# Patient Record
Sex: FEMALE | Race: WHITE | NOT HISPANIC OR LATINO
[De-identification: names, ages, dates, MRNs, and addresses within clinical notes are randomized per-mention and may not be internally consistent; named-entity substitution may affect disease eponyms.]

---

## 2017-09-19 ENCOUNTER — HOSPITAL ENCOUNTER (OUTPATIENT)
Dept: LAB | Age: 37
Setting detail: SPECIMEN
Discharge: HOME | End: 2017-09-19

## 2018-01-13 ENCOUNTER — RECORDS - HEALTHEAST (OUTPATIENT)
Dept: LAB | Facility: CLINIC | Age: 38
End: 2018-01-13

## 2018-01-16 LAB — BACTERIA SPEC CULT: ABNORMAL

## 2018-06-01 ENCOUNTER — RECORDS - HEALTHEAST (OUTPATIENT)
Dept: LAB | Facility: CLINIC | Age: 38
End: 2018-06-01

## 2018-06-01 LAB
IRON SERPL-MCNC: 48 UG/DL (ref 42–175)
TSH SERPL DL<=0.005 MIU/L-ACNC: 1.46 UIU/ML (ref 0.3–5)

## 2018-06-04 LAB
C TRACH DNA SPEC QL PROBE+SIG AMP: NEGATIVE
N GONORRHOEA DNA SPEC QL NAA+PROBE: NEGATIVE

## 2018-06-05 LAB
HPV SOURCE: NORMAL
HUMAN PAPILLOMA VIRUS 16 DNA: NEGATIVE
HUMAN PAPILLOMA VIRUS 18 DNA: NEGATIVE
HUMAN PAPILLOMA VIRUS FINAL DIAGNOSIS: NORMAL
HUMAN PAPILLOMA VIRUS OTHER HR: NEGATIVE
SPECIMEN DESCRIPTION: NORMAL

## 2018-06-08 LAB
BKR LAB AP ABNORMAL BLEEDING: NO
BKR LAB AP BIRTH CONTROL/HORMONES: NORMAL
BKR LAB AP CERVICAL APPEARANCE: NORMAL
BKR LAB AP GYN ADEQUACY: NORMAL
BKR LAB AP GYN INTERPRETATION: NORMAL
BKR LAB AP HPV REFLEX: NORMAL
BKR LAB AP LMP: NORMAL
BKR LAB AP PATIENT STATUS: NORMAL
BKR LAB AP PREVIOUS ABNORMAL: NORMAL
BKR LAB AP PREVIOUS NORMAL: 2014
HIGH RISK?: NO
PATH REPORT.COMMENTS IMP SPEC: NORMAL
RESULT FLAG (HE HISTORICAL CONVERSION): NORMAL

## 2018-08-15 ENCOUNTER — RECORDS - HEALTHEAST (OUTPATIENT)
Dept: LAB | Facility: CLINIC | Age: 38
End: 2018-08-15

## 2018-08-15 LAB — POTASSIUM BLD-SCNC: 4.4 MMOL/L (ref 3.5–5)

## 2018-08-16 ASSESSMENT — MIFFLIN-ST. JEOR: SCORE: 1313.18

## 2018-08-20 ENCOUNTER — ANESTHESIA - HEALTHEAST (OUTPATIENT)
Dept: SURGERY | Facility: AMBULATORY SURGERY CENTER | Age: 38
End: 2018-08-20

## 2018-08-21 ENCOUNTER — SURGERY - HEALTHEAST (OUTPATIENT)
Dept: SURGERY | Facility: AMBULATORY SURGERY CENTER | Age: 38
End: 2018-08-21

## 2018-08-21 ASSESSMENT — MIFFLIN-ST. JEOR: SCORE: 1331.32

## 2018-08-22 ENCOUNTER — RECORDS - HEALTHEAST (OUTPATIENT)
Dept: LAB | Facility: CLINIC | Age: 38
End: 2018-08-22

## 2018-08-22 LAB
ALBUMIN SERPL-MCNC: 3.7 G/DL (ref 3.5–5)
ALP SERPL-CCNC: 62 U/L (ref 45–120)
ALT SERPL W P-5'-P-CCNC: 14 U/L (ref 0–45)
ANION GAP SERPL CALCULATED.3IONS-SCNC: 6 MMOL/L (ref 5–18)
AST SERPL W P-5'-P-CCNC: 15 U/L (ref 0–40)
BILIRUB SERPL-MCNC: 0.3 MG/DL (ref 0–1)
BUN SERPL-MCNC: 10 MG/DL (ref 8–22)
CALCIUM SERPL-MCNC: 9.1 MG/DL (ref 8.5–10.5)
CHLORIDE BLD-SCNC: 107 MMOL/L (ref 98–107)
CO2 SERPL-SCNC: 26 MMOL/L (ref 22–31)
CREAT SERPL-MCNC: 0.92 MG/DL (ref 0.6–1.1)
GFR SERPL CREATININE-BSD FRML MDRD: >60 ML/MIN/1.73M2
GLUCOSE BLD-MCNC: 80 MG/DL (ref 70–125)
POTASSIUM BLD-SCNC: 4 MMOL/L (ref 3.5–5)
PROT SERPL-MCNC: 6.4 G/DL (ref 6–8)
SODIUM SERPL-SCNC: 139 MMOL/L (ref 136–145)

## 2018-08-23 LAB
B BURGDOR IGG+IGM SER QL: 0.04 INDEX VALUE
B MICROTI BLD SMEAR: NORMAL

## 2018-08-25 LAB
A PHAGOCYTOPH DNA BLD QL NAA+PROBE: NOT DETECTED
E CHAFFEENSIS DNA BLD QL NAA+PROBE: NOT DETECTED
E EWINGII DNA SPEC QL NAA+PROBE: NOT DETECTED
EHRLICHIA DNA SPEC QL NAA+PROBE: NOT DETECTED

## 2019-07-15 ENCOUNTER — ANESTHESIA - HEALTHEAST (OUTPATIENT)
Dept: SURGERY | Facility: AMBULATORY SURGERY CENTER | Age: 39
End: 2019-07-15

## 2019-07-16 ENCOUNTER — SURGERY - HEALTHEAST (OUTPATIENT)
Dept: SURGERY | Facility: AMBULATORY SURGERY CENTER | Age: 39
End: 2019-07-16

## 2019-07-16 ASSESSMENT — MIFFLIN-ST. JEOR: SCORE: 1331.32

## 2019-12-09 ENCOUNTER — RECORDS - HEALTHEAST (OUTPATIENT)
Dept: ADMINISTRATIVE | Facility: OTHER | Age: 39
End: 2019-12-09

## 2019-12-09 ENCOUNTER — RECORDS - HEALTHEAST (OUTPATIENT)
Dept: LAB | Facility: CLINIC | Age: 39
End: 2019-12-09

## 2019-12-09 LAB
ALBUMIN SERPL-MCNC: 4.2 G/DL (ref 3.5–5)
ALP SERPL-CCNC: 71 U/L (ref 45–120)
ALT SERPL W P-5'-P-CCNC: 11 U/L (ref 0–45)
ANION GAP SERPL CALCULATED.3IONS-SCNC: 9 MMOL/L (ref 5–18)
AST SERPL W P-5'-P-CCNC: 17 U/L (ref 0–40)
BILIRUB SERPL-MCNC: 0.7 MG/DL (ref 0–1)
BUN SERPL-MCNC: 9 MG/DL (ref 8–22)
CALCIUM SERPL-MCNC: 9.4 MG/DL (ref 8.5–10.5)
CHLORIDE BLD-SCNC: 108 MMOL/L (ref 98–107)
CO2 SERPL-SCNC: 24 MMOL/L (ref 22–31)
CREAT SERPL-MCNC: 1.02 MG/DL (ref 0.6–1.1)
ERYTHROCYTE [SEDIMENTATION RATE] IN BLOOD BY WESTERGREN METHOD: 5 MM/HR (ref 0–20)
GFR SERPL CREATININE-BSD FRML MDRD: >60 ML/MIN/1.73M2
GLUCOSE BLD-MCNC: 83 MG/DL (ref 70–125)
IRON SERPL-MCNC: 121 UG/DL (ref 42–175)
POTASSIUM BLD-SCNC: 3.5 MMOL/L (ref 3.5–5)
PROT SERPL-MCNC: 7 G/DL (ref 6–8)
SODIUM SERPL-SCNC: 141 MMOL/L (ref 136–145)
TSH SERPL DL<=0.005 MIU/L-ACNC: 1.85 UIU/ML (ref 0.3–5)
VIT B12 SERPL-MCNC: 1911 PG/ML (ref 213–816)

## 2020-01-16 ENCOUNTER — RECORDS - HEALTHEAST (OUTPATIENT)
Dept: LAB | Facility: CLINIC | Age: 40
End: 2020-01-16

## 2020-01-16 ENCOUNTER — RECORDS - HEALTHEAST (OUTPATIENT)
Dept: ADMINISTRATIVE | Facility: OTHER | Age: 40
End: 2020-01-16

## 2020-01-16 LAB
MAGNESIUM SERPL-MCNC: 2 MG/DL (ref 1.8–2.6)
VIT B12 SERPL-MCNC: 1496 PG/ML (ref 213–816)

## 2020-01-17 ENCOUNTER — RECORDS - HEALTHEAST (OUTPATIENT)
Dept: ADMINISTRATIVE | Facility: OTHER | Age: 40
End: 2020-01-17

## 2020-01-17 ENCOUNTER — RECORDS - HEALTHEAST (OUTPATIENT)
Dept: LAB | Facility: HOSPITAL | Age: 40
End: 2020-01-17

## 2020-01-17 LAB
B BURGDOR IGG+IGM SER QL: 0.05 INDEX VALUE
CK SERPL-CCNC: 37 U/L (ref 30–190)
FERRITIN SERPL-MCNC: 15 NG/ML (ref 10–130)
GLIADIN IGA SER-ACNC: 0.5 U/ML
GLIADIN IGG SER-ACNC: <0.4 U/ML
IGA SERPL-MCNC: 150 MG/DL (ref 65–400)
TTG IGA SER-ACNC: 0.3 U/ML
TTG IGG SER-ACNC: <0.6 U/ML

## 2020-01-19 LAB
B MICROTI IGG TITR SER: NORMAL {TITER}
E CHAFFEENSIS IGG TITR SER IF: NORMAL {TITER}
E CHAFFEENSIS IGM TITR SER IF: NORMAL {TITER}

## 2020-01-20 LAB
ALBUMIN PERCENT: 63.6 % (ref 51–67)
ALBUMIN SERPL ELPH-MCNC: 4.5 G/DL (ref 3.2–4.7)
ALPHA 1 PERCENT: 2.3 % (ref 2–4)
ALPHA 2 PERCENT: 9.8 % (ref 5–13)
ALPHA1 GLOB SERPL ELPH-MCNC: 0.2 G/DL (ref 0.1–0.3)
ALPHA2 GLOB SERPL ELPH-MCNC: 0.7 G/DL (ref 0.4–0.9)
ANA SER QL: 0.4 U
B-GLOBULIN SERPL ELPH-MCNC: 0.7 G/DL (ref 0.7–1.2)
BETA PERCENT: 9.5 % (ref 10–17)
GAMMA GLOB SERPL ELPH-MCNC: 1 G/DL (ref 0.6–1.4)
GAMMA GLOBULIN PERCENT: 14.8 % (ref 9–20)
PATH ICD:: ABNORMAL
PROT PATTERN SERPL ELPH-IMP: ABNORMAL
PROT SERPL-MCNC: 7 G/DL (ref 6–8)
REVIEWING PATHOLOGIST: ABNORMAL

## 2020-01-21 LAB
METHYLMALONATE SERPL-SCNC: <0.1 UMOL/L (ref 0–0.4)
VIT B1 PYROPHOSHATE BLD-SCNC: 116 NMOL/L (ref 70–180)

## 2020-01-24 ENCOUNTER — HOSPITAL ENCOUNTER (OUTPATIENT)
Dept: MRI IMAGING | Facility: HOSPITAL | Age: 40
Discharge: HOME | End: 2020-01-24
Attending: PSYCHIATRY & NEUROLOGY

## 2020-01-24 DIAGNOSIS — R25.3 MUSCLE FASCICULATION: ICD-10-CM

## 2020-01-24 DIAGNOSIS — R41.89 COGNITIVE DEFICITS: ICD-10-CM

## 2020-01-24 DIAGNOSIS — A69.20 LYME DISEASE: ICD-10-CM

## 2020-01-24 DIAGNOSIS — R29.898 ARM WEAKNESS: ICD-10-CM

## 2020-01-24 DIAGNOSIS — G47.00 INSOMNIA: ICD-10-CM

## 2020-01-24 DIAGNOSIS — F41.9 ANXIETY: ICD-10-CM

## 2020-01-28 ENCOUNTER — AMBULATORY - HEALTHEAST (OUTPATIENT)
Dept: BEHAVIORAL HEALTH | Facility: CLINIC | Age: 40
End: 2020-01-28

## 2020-01-28 DIAGNOSIS — R25.3 MUSCULAR FASCICULATION: ICD-10-CM

## 2020-01-28 DIAGNOSIS — G47.00 INSOMNIA: ICD-10-CM

## 2020-01-28 DIAGNOSIS — R29.898 ARM WEAKNESS: ICD-10-CM

## 2020-01-28 DIAGNOSIS — A69.20 LYME DISEASE: ICD-10-CM

## 2020-01-28 DIAGNOSIS — F41.9 ANXIETY: ICD-10-CM

## 2020-01-28 DIAGNOSIS — R41.89 COGNITIVE DEFICITS: ICD-10-CM

## 2020-03-09 ENCOUNTER — AMBULATORY - HEALTHEAST (OUTPATIENT)
Dept: BEHAVIORAL HEALTH | Facility: CLINIC | Age: 40
End: 2020-03-09

## 2020-03-09 DIAGNOSIS — A69.20 LYME DISEASE: ICD-10-CM

## 2020-03-09 DIAGNOSIS — G47.00 INSOMNIA: ICD-10-CM

## 2020-03-09 DIAGNOSIS — R41.89 COGNITIVE DEFICITS: ICD-10-CM

## 2020-03-09 DIAGNOSIS — F41.9 ANXIETY: ICD-10-CM

## 2020-03-09 DIAGNOSIS — R25.3 MUSCULAR FASCICULATION: ICD-10-CM

## 2020-03-09 DIAGNOSIS — R29.898 ARM WEAKNESS: ICD-10-CM

## 2020-03-09 DIAGNOSIS — G47.00 PERSISTENT DISORDER OF INITIATING OR MAINTAINING SLEEP: ICD-10-CM

## 2021-04-16 ENCOUNTER — RECORDS - HEALTHEAST (OUTPATIENT)
Dept: LAB | Facility: CLINIC | Age: 41
End: 2021-04-16

## 2021-04-16 LAB
ANION GAP SERPL CALCULATED.3IONS-SCNC: 9 MMOL/L (ref 5–18)
BUN SERPL-MCNC: 6 MG/DL (ref 8–22)
CALCIUM SERPL-MCNC: 9 MG/DL (ref 8.5–10.5)
CHLORIDE BLD-SCNC: 106 MMOL/L (ref 98–107)
CO2 SERPL-SCNC: 26 MMOL/L (ref 22–31)
CREAT SERPL-MCNC: 0.81 MG/DL (ref 0.6–1.1)
GFR SERPL CREATININE-BSD FRML MDRD: >60 ML/MIN/1.73M2
GLUCOSE BLD-MCNC: 85 MG/DL (ref 70–125)
POTASSIUM BLD-SCNC: 3.9 MMOL/L (ref 3.5–5)
SODIUM SERPL-SCNC: 141 MMOL/L (ref 136–145)

## 2021-04-19 LAB — BACTERIA SPEC CULT: ABNORMAL

## 2021-05-30 NOTE — ANESTHESIA POSTPROCEDURE EVALUATION
Patient: Lida Leon  LAPAROSCOPY, FULGERATION OF ENDOMETRIOSIS  Anesthesia type: general    Patient location: Phase II Recovery  Last vitals:   Vitals Value Taken Time   /72 7/16/2019 11:15 AM   Temp 36.1  C (97  F) 7/16/2019 11:07 AM   Pulse 68 7/16/2019 11:18 AM   Resp 16 7/16/2019 11:07 AM   SpO2 98 % 7/16/2019 11:18 AM   Vitals shown include unvalidated device data.  Post vital signs: stable  Level of consciousness: awake and responds to simple questions  Post-anesthesia pain: pain controlled  Post-anesthesia nausea and vomiting: no  Pulmonary: unassisted, return to baseline  Cardiovascular: stable and blood pressure at baseline  Hydration: adequate  Anesthetic events: no    QCDR Measures:  ASA# 11 - Maliha-op Cardiac Arrest: ASA11B - Patient did NOT experience unanticipated cardiac arrest  ASA# 12 - Maliha-op Mortality Rate: ASA12B - Patient did NOT die  ASA# 13 - PACU Re-Intubation Rate: ASA13B - Patient did NOT require a new airway mgmt  ASA# 10 - Composite Anes Safety: ASA10A - No serious adverse event    Additional Notes:

## 2021-05-30 NOTE — ANESTHESIA PREPROCEDURE EVALUATION
Anesthesia Evaluation      Patient summary reviewed   History of anesthetic complications (PONV)     Airway   Mallampati: I  Neck ROM: full   Pulmonary - negative ROS and normal exam    breath sounds clear to auscultation                         Cardiovascular - negative ROS  Rhythm: regular  Rate: normal,         Neuro/Psych      Comments: Hx TIA    Endo/Other - negative ROS      GI/Hepatic/Renal - negative ROS           Dental - normal exam                        Anesthesia Plan  Planned anesthetic: general endotracheal  Propofol infusion ~100 mcg/kg/min w/ ~0.5 MAC inhaled agent  Toradol 15 mg pre-emergence if ok with surgeon      ASA 2   Induction: intravenous   Anesthetic plan and risks discussed with: patient  Anesthesia plan special considerations: antiemetics,   Post-op plan: routine recovery

## 2021-05-30 NOTE — ANESTHESIA CARE TRANSFER NOTE
Last vitals:   Vitals:    07/16/19 1010   BP: 122/78   Pulse: 92   Resp: 14   Temp: 36.1  C (96.9  F)   SpO2: 100%     Patient's level of consciousness is drowsy  Spontaneous respirations: yes  Maintains airway independently: yes  Dentition unchanged: yes  Oropharynx: oropharynx clear of all foreign objects    QCDR Measures:  ASA# 20 - Surgical Safety Checklist: WHO surgical safety checklist completed prior to induction    PQRS# 430 - Adult PONV Prevention: 4558F - Pt received => 2 anti-emetic agents (different classes) preop & intraop  ASA# 8 - Peds PONV Prevention: NA - Not pediatric patient, not GA or 2 or more risk factors NOT present  PQRS# 424 - Maliha-op Temp Management: 4559F - At least one body temp DOCUMENTED => 35.5C or 95.9F within required timeframe  PQRS# 426 - PACU Transfer Protocol: - Transfer of care checklist used  ASA# 14 - Acute Post-op Pain: ASA14B - Patient did NOT experience pain >= 7 out of 10

## 2021-06-01 VITALS — HEIGHT: 68 IN | WEIGHT: 134 LBS | BODY MASS INDEX: 20.31 KG/M2

## 2021-06-03 VITALS — WEIGHT: 134 LBS | BODY MASS INDEX: 20.31 KG/M2 | HEIGHT: 68 IN

## 2021-06-07 NOTE — PROGRESS NOTES
Neuropsychological Evaluation            Name: Lida Leon                          Date of Evaluation: 3/9/2020                  Education: 16 years          Date of Birth (Age): 1980 (39)      REFERRAL QUESTION:   Referred by Marcelo Alfredo MD, for evaluation of cognitive functioning secondary complaints of cognitive difficulties following diagnosis and treatment for Lyme s disease in 2018.      This report was prepared by Marty Casanova, Ph.D., [facility]-, Board Certified Clinical Neuropsychologist.     BACKGROUND: The following report was taken from an interview with the patient, a review of her existing records, and the current neuropsychological evaluation. Ms. Leon is a 39-year-old, right-handed, female patient with a medical and psychiatric history that includes Lyme disease, muscle fasciculation, and anxiety.    The summary and recommendations will be discussed at the beginning of the report, with an extended report and history following, with more detailed results at the end of the document.    SUMMARY AND RECOMMENDATIONS    At this time, Ms. Edward blas neuropsychological profile is generally within normal limits, with a relative weakness in some areas of attention and initial acquisition. Her memory appears to be well within normal limits at this time, with no compelling evidence of a primary memory dysfunction a pastora delayed recall falls within normal limits. At this point in time, her day-to-day cognitive difficulties may be exacerbated by reported symptoms of significant depression and anxiety, and possible sleep disturbances.      DIAGNOSIS  No Cognitive disorder at this time, anxiety, poor sleep    IMPRESSIONS AND RECOMMENDATIONS:    1. During the current assessment, Ms. Edward blas performance was generally within normal limits with some areas of relative weakness in attention and initial acquisition.     2. Ms. Leon was provided with materials regarding compensatory mechanisms     3. Ms.  Edward was encouraged to engage with providers regarding her sleep difficulties. It is likely that her poor sleep is significantly affecting her subjective cognition on a day-to-day basis. It is likely that she would experience an improvement in her perceived cognitive function with improved quantity and quality of sleep.     4. The current testing may act as a baseline of cognitive functioning. If she experiences a significant change, she may ask providers for a referral for follow up testing.     Thank you for including me in the care of this patient.    Marty Casanova, Ph.D., [facility]  Board Certified Clinical Neuropsychologist  [phone]  [email or web address]    _______________Extended Report_____________________    RELEVANT MEDICAL HISTORY    Ms. Leon recently presented to the [facility] on 1[facility]20 with complaints of  brain fog,  muscle fasciculation, and headaches. She also reported a recent episode of not being able to use her right arm for a short period of time. Neurological exam at that visit was generally unremarkable. Further evaluation was ordered to determine possible etiologies of her complaints.     Recent MRI of the brain 1/24/20 stated  No acute or subacute infarct. No mass, acute hemorrhage, or extra-axial fluid collections. Normal brain parenchymal signal. Normal ventricles and sulci. Normal position of the cerebellar tonsils. No pathologic enhancement.      Recent labs (1/16/20; 1/17/20) were within normal limits for ferritin, MMA, GISELLE cascade, CK total, B1, magnesium, Lyme antibody cascade, erlichia chaffeensis antibodies, babesia microbe antibodies, and celiac antibodies. Vitamin B12 was high.     Current reported medications spironolactone. She reported taking Paxil and Wellbutrin in the past, but having adverse reactions leading her to discontinue these medications.      Please see the electronic record for a full review of Ms. Leon s medical  history.    PSYCHOSOCIAL HISTORY  Ms. Leon currently lives alone in [place], [place]. She reported that she graduated from high school and later earned her bachelor s degree. She has been unemployed since July 2019. She denied the presence of ADHD or learning disorders in childhood.     Regarding family medical history, she reported history of high cholesterol in parents, lung cancer in grandparents, and heart disease. She did not report any family history of neurological issues.     Ms. Leon denied the use of tobacco products. She denied the current or past use of illicit drugs. She reported that she rarely consumes alcohol.     PRESENTING CONCERNS  Ms. Leon presented to the current evaluation reporting various difficulties cognition. She reported that she has been feeling like her brain has been in a  fog  in recent months, and that she is always  10-steps behind  when it comes to her thought processes. She recalled an incident this past holiday season playing board games with family and needing the rules explained multiple times; even though they were playing games she was familiar with. She reported some problems with remote recall, but noted that the memory will eventually  come back to her. She reported that these difficulties began shortly after her diagnosis and treatment with Lyme disease in August 2018 and that the difficulties have slowly worsened over time.     Functionally, she is reportedly managing her own medications with no difficulty. She manages the household finances with no reported difficulty. She is driving with no reported accidents, tickets, or getting lost, though she does report being more cautious while driving. She denied any changes or difficulties in activities of grooming or self-care.     PHYSICAL AND EMOTIONAL FUNCTIONING  Ms. Leon reported hearing as stable recently. Vision is reportedly stable. Weight is reportedly stable. She is reporting that she is averaging three  hours of sleep a night, and will get six hours on a  good night  of sleep. She reports problems with both initiation and maintenance of sleep. She reported mild problems with balance and coordination, but denied any recent falls. She reported intermittent episodes of dizziness. She reported a history of migraine headaches, which generally respond with her menstrual cycle, with accompanying visual aura. She reported some speech changes, such as using the wrong words for things, or stuttering, that occur intermittently.     Emotionally, she reported her recent mood as  good.  She reported a longstanding history of anxiety, and a past diagnosis of panic disorder. She expressly denied suicidal ideation at this time.     BEHAVIORAL OBSERVATIONS:  Ms. Leon arrived on time for the assessment, unaccompanied. She was dressed casually, appropriately groomed, and appeared her stated age.  She ambulated independently and with no obvious gait abnormality. She was generally oriented to person place and time, and understood the purpose of the evaluation.  Rate, rhythm, and prosody of speech were within normal limits and language was conversationally intact. No significant stuttering or word-findings problems were observed during interview. Her affect was full and generally euthymic. The patient was generally pleasant and cooperative with the examiner.     TESTS ADMINISTERED:  Clinical Interview; Validated measures of effort; Wechsler Test of Adult Reading (WTAR); Wechsler Adult Intelligence Scale- 4th Edition (WAIS-IV, similarities, symbol search, coding, block design, and digit span subtests only); Wechsler Memory Scales - 4th Edition (Logical Memory); [place] Verbal Learning Test - 2nd Edition (CVLT; Standard Form), Brief Visuospatial Memory Test (BVMT; Form 1); Maxton Making Test A&B; Binh-Osterreith Complex Figure (Copy trial); Atkins Depression Inventory, Atkins Anxiety Inventory; [place] Multiphasic Personality Inventory  -MMPI-II-RF.     Billing and Documentation:   Time spent in neurobehavioral exam was approximately 33 minutes for 1 unit of 63731. Time spent administering and scoring tests was approximately 152 minutes (1 unit of 04996, 4 units of 70658). Time spent in integration of patient data, interpretation of standardized test results and clinical data, clinical decision making, treatment planning, report writing, and feedback to patient was approximately 168 minutes (1 unit of 78024, 2 units of 43817) Interview, evaluation, and feedback were performed on 3/9/2020.     Interpretation   91%+    - Superior  75-90% - High Average  26-74% - Average  16-25% - Low Average  3-15%   - Mildly Impaired  1-2%     - Moderately Impaired  <1%      - Severely Impaired    RESULTS:     MEMORY  On a measure of memory, for verbally presented, word list information, she performed in the average range for immediate recall of the information across learning trials. Her delayed recall of the information was average. Her recognition cueing for this information was below expectation (14/16 hits, 0 false positives). On a measure of verbally presented story information, she performed in the mildly impaired range for immediate recall of the information and in the low average range for delayed free recall of the information. Her recognition cueing of this information was average (23/30 correct). Regarding figural memory, her immediate recall of visual information was mildly impaired across learning trials. Her free recall of this information after a delay was high average. Recognition cueing of this information was below expectations (5/6 hits, 0 false positives).     ATTENTION/WORKING MEMORY/PROCESSING SPEED  Ms. Edward blas performance on measures of graphomotor processing speed were average to high average. Verbal auditory attention was low average. Speeded visual scanning was mildly impaired.      VISUOSPATIAL/VISUOCONSTRUCTIONAL  Her copy of a  complex figure was within normal limits.     EXECUTIVE FUNCTIONING  On a task of speeded visual scanning with set shifting, she performed in the mildly impaired range. This appeared due to speed issues based on speed of a preceding task and no errors while completing this task.    MOOD  On a self-report instrument of symptoms of depression, she endorsed symptoms in the mild range. On a self-report instrument of anxiety, she reported symptoms in the mild to moderate range. On a separate instrument of emotion and personality functioning, she produced a profile suggestive of over-reporting, and some inconsistency in responses, suggesting cautious interpretation of the clinical scales. She endorsed items consistent with individuals reporting an increased amount of somatic complaints and aberrant experiences. On somatic scales, she endorsed items associated with higher levels of neurological and cognitive complaints. On other scales, she endorsed items consistent with elevated levels of anxiety and behavior restricting fears.     Name: Lida Leon                 Educ: 16   Age: 39   Sex: F                                    3/9/2020  TEST                             Raw   SS  WAIS-IV    Similarities                    31   13    Block Design                    52   12    Digit Span                      25    8  WAIS-IV Processing Speed          23   108    Symbol Search                   39   12    Coding                          78   11    MEMORY - VERBAL  WMS-IV Logical Memory    Immediate Recall                17    6    Delayed Recall                  18    8    Delayed Recognition (/30)       23  [place] Verbal Learning Test-II    Trial 1                         8    0.5    Trials 1-5 (T-score)            53   48    Trial B                         5    -1    SD Free                         11  -0.5    SD Cued                         12  -0.5    LD Free                         12  -0.5    LD Cued                   "       12  -0.5    Total Hits                      14  -1.5    Rec. FP                         0    MEMORY - VISUAL  Brief Vis. Mem. Test - R (Form:)  1    Immediate Recall                18   33T    Delayed Recall                  11   57T    Delayed Discrimination          5   82-85    EXECUTIVE FUNCTIONS  Trails: A                        30\"   39T  Trails: B                        65\"   39T    VISUOSPATIAL  Binh Copy                         WNL    MOOD & PERSONALITY  Atkins Depression Inventory-II      15  Atkins Anxiety Inventory            15  "

## 2021-06-20 NOTE — ANESTHESIA POSTPROCEDURE EVALUATION
Patient: Lida Leon  HYSTEROSCOPY, WITH DILATION AND CURETTAGE, POLYPECTOMY, POLYPECTOMY, CERVICAL  Anesthesia type: MAC    Patient location: Phase II Recovery  Last vitals:   Vitals:    08/21/18 1200   BP: 101/69   Pulse: 62   Resp: 16   Temp:    SpO2: 100%     Post vital signs: stable  Level of consciousness: awake and responds to simple questions  Post-anesthesia pain: pain controlled  Post-anesthesia nausea and vomiting: no  Pulmonary: unassisted, return to baseline  Cardiovascular: stable and blood pressure at baseline  Hydration: adequate  Anesthetic events: no    QCDR Measures:  ASA# 11 - Maliha-op Cardiac Arrest: ASA11B - Patient did NOT experience unanticipated cardiac arrest  ASA# 12 - Maliha-op Mortality Rate: ASA12B - Patient did NOT die  ASA# 13 - PACU Re-Intubation Rate: ASA13B - Patient did NOT require a new airway mgmt  ASA# 10 - Composite Anes Safety: ASA10A - No serious adverse event    Additional Notes:

## 2021-06-20 NOTE — ANESTHESIA CARE TRANSFER NOTE
Last vitals:   Vitals:    08/21/18 1106   BP: 110/70   Pulse: 66   Resp: 14   Temp: 36.2  C (97.2  F)   SpO2:      Patient's level of consciousness is drowsy  Spontaneous respirations: yes  Maintains airway independently: yes  Dentition unchanged: yes  Oropharynx: oropharynx clear of all foreign objects    QCDR Measures:  ASA# 20 - Surgical Safety Checklist: WHO surgical safety checklist completed prior to induction  PQRS# 430 - Adult PONV Prevention: 4558F - Pt received => 2 anti-emetic agents (different classes) preop & intraop  ASA# 8 - Peds PONV Prevention: NA - Not pediatric patient, not GA or 2 or more risk factors NOT present  PQRS# 424 - Maliha-op Temp Management: 4559F - At least one body temp DOCUMENTED => 35.5C or 95.9F within required timeframe  PQRS# 426 - PACU Transfer Protocol: - Transfer of care checklist used  ASA# 14 - Acute Post-op Pain: ASA14B - Patient did NOT experience pain >= 7 out of 10

## 2021-06-20 NOTE — ANESTHESIA PREPROCEDURE EVALUATION
Anesthesia Evaluation      Patient summary reviewed   History of anesthetic complications     Airway   Mallampati: I   Pulmonary - normal exam   (-) pneumonia                         Cardiovascular - negative ROS and normal exam   Neuro/Psych      Comments: H/O TIA 2009    Endo/Other       GI/Hepatic/Renal - negative ROS      Other findings: Hb 13.0, K 4.4  PONV      Dental - normal exam                        Anesthesia Plan  Planned anesthetic: MAC    ASA 2     Anesthetic plan and risks discussed with: patient    Post-op plan: routine recovery

## 2025-01-11 ENCOUNTER — APPOINTMENT (OUTPATIENT)
Dept: CT IMAGING | Facility: HOSPITAL | Age: 45
End: 2025-01-11
Attending: EMERGENCY MEDICINE
Payer: COMMERCIAL

## 2025-01-11 ENCOUNTER — HOSPITAL ENCOUNTER (OUTPATIENT)
Facility: HOSPITAL | Age: 45
Setting detail: OBSERVATION
Discharge: HOME | End: 2025-01-12
Attending: EMERGENCY MEDICINE | Admitting: EMERGENCY MEDICINE
Payer: COMMERCIAL

## 2025-01-11 ENCOUNTER — APPOINTMENT (OUTPATIENT)
Dept: MRI IMAGING | Facility: HOSPITAL | Age: 45
End: 2025-01-11
Attending: PSYCHIATRY & NEUROLOGY
Payer: COMMERCIAL

## 2025-01-11 DIAGNOSIS — G45.4 TGA (TRANSIENT GLOBAL AMNESIA): ICD-10-CM

## 2025-01-11 LAB
AMPHETAMINES UR QL SCN: ABNORMAL
ANION GAP SERPL CALCULATED.3IONS-SCNC: 13 MMOL/L (ref 7–15)
APTT PPP: 27 SECONDS (ref 22–38)
BARBITURATES UR QL SCN: ABNORMAL
BASOPHILS # BLD AUTO: 0 10E3/UL (ref 0–0.2)
BASOPHILS NFR BLD AUTO: 0 %
BENZODIAZ UR QL SCN: ABNORMAL
BUN SERPL-MCNC: 12 MG/DL (ref 6–20)
BZE UR QL SCN: ABNORMAL
CALCIUM SERPL-MCNC: 9.3 MG/DL (ref 8.8–10.4)
CANNABINOIDS UR QL SCN: ABNORMAL
CHLORIDE SERPL-SCNC: 105 MMOL/L (ref 98–107)
CREAT SERPL-MCNC: 0.84 MG/DL (ref 0.51–0.95)
EGFRCR SERPLBLD CKD-EPI 2021: 88 ML/MIN/1.73M2
EOSINOPHIL # BLD AUTO: 0.1 10E3/UL (ref 0–0.7)
EOSINOPHIL NFR BLD AUTO: 2 %
ERYTHROCYTE [DISTWIDTH] IN BLOOD BY AUTOMATED COUNT: 13.5 % (ref 10–15)
FENTANYL UR QL: ABNORMAL
GLUCOSE BLDC GLUCOMTR-MCNC: 108 MG/DL (ref 70–99)
GLUCOSE SERPL-MCNC: 99 MG/DL (ref 70–99)
HCG SERPL QL: NEGATIVE
HCO3 SERPL-SCNC: 20 MMOL/L (ref 22–29)
HCT VFR BLD AUTO: 42.3 % (ref 35–47)
HGB BLD-MCNC: 14.2 G/DL (ref 11.7–15.7)
IMM GRANULOCYTES # BLD: 0 10E3/UL
IMM GRANULOCYTES NFR BLD: 0 %
INR PPP: 1.01 (ref 0.85–1.15)
LYMPHOCYTES # BLD AUTO: 1.2 10E3/UL (ref 0.8–5.3)
LYMPHOCYTES NFR BLD AUTO: 16 %
MAGNESIUM SERPL-MCNC: 1.9 MG/DL (ref 1.7–2.3)
MCH RBC QN AUTO: 30.4 PG (ref 26.5–33)
MCHC RBC AUTO-ENTMCNC: 33.6 G/DL (ref 31.5–36.5)
MCV RBC AUTO: 91 FL (ref 78–100)
MONOCYTES # BLD AUTO: 0.5 10E3/UL (ref 0–1.3)
MONOCYTES NFR BLD AUTO: 7 %
NEUTROPHILS # BLD AUTO: 5.5 10E3/UL (ref 1.6–8.3)
NEUTROPHILS NFR BLD AUTO: 75 %
NRBC # BLD AUTO: 0 10E3/UL
NRBC BLD AUTO-RTO: 0 /100
OPIATES UR QL SCN: ABNORMAL
PCP QUAL URINE (ROCHE): ABNORMAL
PHOSPHATE SERPL-MCNC: 2.4 MG/DL (ref 2.5–4.5)
PLATELET # BLD AUTO: 249 10E3/UL (ref 150–450)
POTASSIUM SERPL-SCNC: 3.6 MMOL/L (ref 3.4–5.3)
RBC # BLD AUTO: 4.67 10E6/UL (ref 3.8–5.2)
SODIUM SERPL-SCNC: 138 MMOL/L (ref 135–145)
TROPONIN T SERPL HS-MCNC: <6 NG/L
TSH SERPL DL<=0.005 MIU/L-ACNC: 2.48 UIU/ML (ref 0.3–4.2)
WBC # BLD AUTO: 7.3 10E3/UL (ref 4–11)

## 2025-01-11 PROCEDURE — 84443 ASSAY THYROID STIM HORMONE: CPT

## 2025-01-11 PROCEDURE — 250N000011 HC RX IP 250 OP 636: Performed by: EMERGENCY MEDICINE

## 2025-01-11 PROCEDURE — 70496 CT ANGIOGRAPHY HEAD: CPT

## 2025-01-11 PROCEDURE — 99222 1ST HOSP IP/OBS MODERATE 55: CPT | Mod: AI | Performed by: STUDENT IN AN ORGANIZED HEALTH CARE EDUCATION/TRAINING PROGRAM

## 2025-01-11 PROCEDURE — 70551 MRI BRAIN STEM W/O DYE: CPT

## 2025-01-11 PROCEDURE — 80307 DRUG TEST PRSMV CHEM ANLYZR: CPT

## 2025-01-11 PROCEDURE — 99207 PR APP CREDIT; MD BILLING SHARED VISIT: CPT

## 2025-01-11 PROCEDURE — 80048 BASIC METABOLIC PNL TOTAL CA: CPT | Performed by: EMERGENCY MEDICINE

## 2025-01-11 PROCEDURE — 84100 ASSAY OF PHOSPHORUS: CPT

## 2025-01-11 PROCEDURE — 85610 PROTHROMBIN TIME: CPT | Performed by: EMERGENCY MEDICINE

## 2025-01-11 PROCEDURE — G0378 HOSPITAL OBSERVATION PER HR: HCPCS

## 2025-01-11 PROCEDURE — 85025 COMPLETE CBC W/AUTO DIFF WBC: CPT | Performed by: EMERGENCY MEDICINE

## 2025-01-11 PROCEDURE — 36415 COLL VENOUS BLD VENIPUNCTURE: CPT | Performed by: STUDENT IN AN ORGANIZED HEALTH CARE EDUCATION/TRAINING PROGRAM

## 2025-01-11 PROCEDURE — 82435 ASSAY OF BLOOD CHLORIDE: CPT | Performed by: EMERGENCY MEDICINE

## 2025-01-11 PROCEDURE — 84100 ASSAY OF PHOSPHORUS: CPT | Performed by: STUDENT IN AN ORGANIZED HEALTH CARE EDUCATION/TRAINING PROGRAM

## 2025-01-11 PROCEDURE — 85730 THROMBOPLASTIN TIME PARTIAL: CPT | Performed by: EMERGENCY MEDICINE

## 2025-01-11 PROCEDURE — 82962 GLUCOSE BLOOD TEST: CPT

## 2025-01-11 PROCEDURE — 84703 CHORIONIC GONADOTROPIN ASSAY: CPT | Performed by: EMERGENCY MEDICINE

## 2025-01-11 PROCEDURE — 36415 COLL VENOUS BLD VENIPUNCTURE: CPT | Performed by: EMERGENCY MEDICINE

## 2025-01-11 PROCEDURE — 85014 HEMATOCRIT: CPT | Performed by: EMERGENCY MEDICINE

## 2025-01-11 PROCEDURE — 250N000013 HC RX MED GY IP 250 OP 250 PS 637: Performed by: PSYCHIATRY & NEUROLOGY

## 2025-01-11 PROCEDURE — 84484 ASSAY OF TROPONIN QUANT: CPT | Performed by: EMERGENCY MEDICINE

## 2025-01-11 PROCEDURE — 83735 ASSAY OF MAGNESIUM: CPT

## 2025-01-11 PROCEDURE — 93005 ELECTROCARDIOGRAM TRACING: CPT | Performed by: EMERGENCY MEDICINE

## 2025-01-11 PROCEDURE — 99285 EMERGENCY DEPT VISIT HI MDM: CPT | Mod: 25

## 2025-01-11 RX ORDER — LORAZEPAM 2 MG/ML
2 INJECTION INTRAMUSCULAR ONCE
Status: DISCONTINUED | OUTPATIENT
Start: 2025-01-11 | End: 2025-01-11

## 2025-01-11 RX ORDER — NALOXONE HYDROCHLORIDE 0.4 MG/ML
0.4 INJECTION, SOLUTION INTRAMUSCULAR; INTRAVENOUS; SUBCUTANEOUS
Status: DISCONTINUED | OUTPATIENT
Start: 2025-01-11 | End: 2025-01-12 | Stop reason: HOSPADM

## 2025-01-11 RX ORDER — NALOXONE HYDROCHLORIDE 0.4 MG/ML
0.2 INJECTION, SOLUTION INTRAMUSCULAR; INTRAVENOUS; SUBCUTANEOUS
Status: DISCONTINUED | OUTPATIENT
Start: 2025-01-11 | End: 2025-01-12 | Stop reason: HOSPADM

## 2025-01-11 RX ORDER — PROCHLORPERAZINE MALEATE 10 MG
10 TABLET ORAL EVERY 6 HOURS PRN
Status: DISCONTINUED | OUTPATIENT
Start: 2025-01-11 | End: 2025-01-12 | Stop reason: HOSPADM

## 2025-01-11 RX ORDER — IOPAMIDOL 755 MG/ML
67 INJECTION, SOLUTION INTRAVASCULAR ONCE
Status: COMPLETED | OUTPATIENT
Start: 2025-01-11 | End: 2025-01-11

## 2025-01-11 RX ORDER — IBUPROFEN 200 MG
200-400 TABLET ORAL EVERY 6 HOURS PRN
COMMUNITY

## 2025-01-11 RX ORDER — ACETAMINOPHEN 325 MG/1
650 TABLET ORAL EVERY 4 HOURS PRN
Status: DISCONTINUED | OUTPATIENT
Start: 2025-01-11 | End: 2025-01-12 | Stop reason: HOSPADM

## 2025-01-11 RX ORDER — ACETAMINOPHEN 650 MG/1
650 SUPPOSITORY RECTAL EVERY 4 HOURS PRN
Status: DISCONTINUED | OUTPATIENT
Start: 2025-01-11 | End: 2025-01-12 | Stop reason: HOSPADM

## 2025-01-11 RX ORDER — LORAZEPAM 2 MG/ML
2 INJECTION INTRAMUSCULAR ONCE
Status: COMPLETED | OUTPATIENT
Start: 2025-01-11 | End: 2025-01-11

## 2025-01-11 RX ORDER — ONDANSETRON 4 MG/1
4 TABLET, ORALLY DISINTEGRATING ORAL EVERY 6 HOURS PRN
Status: DISCONTINUED | OUTPATIENT
Start: 2025-01-11 | End: 2025-01-12 | Stop reason: HOSPADM

## 2025-01-11 RX ORDER — POLYETHYLENE GLYCOL 3350 17 G/17G
17 POWDER, FOR SOLUTION ORAL 2 TIMES DAILY PRN
Status: DISCONTINUED | OUTPATIENT
Start: 2025-01-11 | End: 2025-01-12 | Stop reason: HOSPADM

## 2025-01-11 RX ORDER — ONDANSETRON 2 MG/ML
4 INJECTION INTRAMUSCULAR; INTRAVENOUS EVERY 6 HOURS PRN
Status: DISCONTINUED | OUTPATIENT
Start: 2025-01-11 | End: 2025-01-12 | Stop reason: HOSPADM

## 2025-01-11 RX ORDER — AMOXICILLIN 250 MG
1 CAPSULE ORAL 2 TIMES DAILY PRN
Status: DISCONTINUED | OUTPATIENT
Start: 2025-01-11 | End: 2025-01-12 | Stop reason: HOSPADM

## 2025-01-11 RX ORDER — IBUPROFEN 200 MG
200 TABLET ORAL EVERY 6 HOURS PRN
Status: DISCONTINUED | OUTPATIENT
Start: 2025-01-11 | End: 2025-01-12 | Stop reason: HOSPADM

## 2025-01-11 RX ORDER — LORAZEPAM 1 MG/1
2 TABLET ORAL ONCE
Status: COMPLETED | OUTPATIENT
Start: 2025-01-11 | End: 2025-01-11

## 2025-01-11 RX ORDER — HYDROXYZINE HYDROCHLORIDE 25 MG/1
25 TABLET, FILM COATED ORAL EVERY 6 HOURS PRN
Status: DISCONTINUED | OUTPATIENT
Start: 2025-01-11 | End: 2025-01-12 | Stop reason: HOSPADM

## 2025-01-11 RX ORDER — AMOXICILLIN 250 MG
2 CAPSULE ORAL 2 TIMES DAILY PRN
Status: DISCONTINUED | OUTPATIENT
Start: 2025-01-11 | End: 2025-01-12 | Stop reason: HOSPADM

## 2025-01-11 RX ADMIN — LORAZEPAM 2 MG: 1 TABLET ORAL at 18:17

## 2025-01-11 RX ADMIN — IOPAMIDOL 67 ML: 755 INJECTION, SOLUTION INTRAVENOUS at 17:25

## 2025-01-11 ASSESSMENT — COLUMBIA-SUICIDE SEVERITY RATING SCALE - C-SSRS
6. HAVE YOU EVER DONE ANYTHING, STARTED TO DO ANYTHING, OR PREPARED TO DO ANYTHING TO END YOUR LIFE?: NO
1. IN THE PAST MONTH, HAVE YOU WISHED YOU WERE DEAD OR WISHED YOU COULD GO TO SLEEP AND NOT WAKE UP?: NO
2. HAVE YOU ACTUALLY HAD ANY THOUGHTS OF KILLING YOURSELF IN THE PAST MONTH?: NO

## 2025-01-11 ASSESSMENT — ACTIVITIES OF DAILY LIVING (ADL)
ADLS_ACUITY_SCORE: 41

## 2025-01-11 NOTE — ED TRIAGE NOTES
About 2:30 patient starting having trouble remembering things, her apartment seemed different like she did not recognize things.  States these symptoms are persisting--Called for a neuro eval.. Arrives with her friend, does have a history of panic attacks and migraine in the past. HEATH=, no motor deficts

## 2025-01-11 NOTE — ED NOTES
"  [facility]    Stroke Telephone Note    I was called by Dima Duong on 01/11/25 regarding patient Lida Leon. The patient is a 44 year old female past medical history of migraine presents with acute onset amnesia since 1430. No focal deficits in ED.    Vitals  BP: 109/67   Pulse: 80   Resp: 18   Temp: 98.1  F (36.7  C)   Weight: 70.8 kg (156 lb)    Stroke Code Data (for stroke code without tele)  Stroke code activated 01/11/25  1718   Stroke provider first response 01/11/25  1718   Last known normal 01/11/25  1430      Time of discovery (or onset of symptoms) 01/11/25  1430   Head CT read by Stroke Neuro Provider 01/11/25  1816   Was stroke code de-escalated? Yes  01/11/25  1841     Imaging Findings  CT head:  no hemorrhage   CTA head/neck: no lvo    Intravenous Thrombolysis  Not given due to:   - No acute stroke on hyperacute  MRI brain, suspect stroke mimic (?TGA)    Endovascular Treatment  Not initiated due to absence of proximal vessel occlusion    Impression  Acute amnesia     No further stroke work up planned, recommend general neuro eval for amnesia.     My recommendations are based on the information provided over the phone by Lida Leon's in-person providers. They are not intended to replace the clinical judgment of her in-person providers. I was not requested to personally see or examine the patient at this time.     The Stroke Staff is Dr. Ford.    Nicolas Joseph MD  Vascular Neurology Fellow    To page me or covering stroke neurology team member, click here: [facility]  Choose \"On Call\" tab at top, then select \"NEUROLOGY/ALL SITES\" from middle drop-down box, press Enter, then look for \"stroke\" or \"telestroke\" for your site.   "

## 2025-01-11 NOTE — ED PROVIDER NOTES
"EMERGENCY DEPARTMENT ENCOUNTER      NAME: Lida Leon  AGE: 44 year old female  YOB: 1980  MRN: [id number]  EVALUATION DATE & TIME: No admission date for patient encounter.    PCP: eLx Mcgovern    ED PROVIDER: Dima Duong D.O.      Chief Complaint   Patient presents with    Altered Mental Status       FINAL IMPRESSION:  1. TGA (transient global amnesia)        ED COURSE & MEDICAL DECISION MAKIN:12 PM I met with the patient to gather history and to perform my initial exam. I discussed the plan for care while in the Emergency Department.  5:18 PM Tier 1 stroke code called.  5:20 PM I spoke with Dr. Joseph, stroke.  5:38 PM Spoke with radiology. CT and CTA both are negative.  5:39 PM Spoke with radiology who ordered a hyperacute MRI.  6:51 PM I updated patient.  7:04 PM Spoke with Sulema Cotton PA-C hospitalist. Accepted patient for admission.  7:18 PM Deescalated stroke code.       Pertinent Labs & Imaging studies reviewed. (See chart for details)  44 year old female presents to the Emergency Department for evaluation of sudden onset of confusion.  Patient reported as not being able to remember things that she typically would remember and feeling like her apartment was \"new\" to her.  On her neuroexam she had no focal findings otherwise, no slurred speech, no difficulty with word finding, and no sensory or muscle strength deficit.  Initial concern was for transient global amnesia versus infectious etiology versus CVA versus other acute process.  Patient was ran as a tier 1 stroke code, but fortunately CT and CTA were both unremarkable.  After discussion with stroke neurology, hyperacute MRI was performed which did not show any evidence of stroke or other acute process.  I do not see obvious infectious etiology.  At this time as we do not see evidence of CVA but the patient does still have some minimal symptoms, plan is for admission for evaluation with general neurology in the morning.  Patient " "was comfortable with this plan.  Discussed with hospitalist who agreed to the admission under observation status.    Medical Decision Making  Obtained supplemental history:Supplemental history obtained?: Documented in chart  Reviewed external records: External records reviewed?: Documented in chart  Care impacted by chronic illness:Other: TIA, anxiety  Did you consider but not order tests?: Work up considered but not performed and documented in chart, if applicable  Did you interpret images independently?: Independent interpretation of ECG and images noted in documentation, when applicable.  Consultation discussion with other provider:Did you involve another provider (consultant, , pharmacy, etc.)?: I discussed the care with another health care provider, see documentation for details.  Admit.    MIPS: Not Applicable        At the conclusion of the encounter I discussed the results of all of the tests and the disposition. The questions were answered. The patient or family acknowledged understanding and was agreeable with the care plan.        HPI    Patient information was obtained from: Patient    Use of : N/A       Lida Leon is a 44 year old female who presents with alerted mental status.    Patient reports an onset of confusion while walking in her apartment approximately at 2:30 pm. She states it felt like she was seeing items in her apartment \"for the first time\". She initially thought this was a migraine coming on and took 3 Advil. She has a history of migraine with aura. Has never experienced anything like this with her migraines or panic attacks. Patient began to feel anxious and texted her mother around 3:50 pm. She went to the urgency room and was sent to the ED for further evaluation. Patient also reports difficulty remembering places on a map that she would normally recognize. She had a TIA in 2008. Patient denies numbness, weakness, nausea, vomiting, and any other symptoms.      Per " "Chart Review: Patient was seen at the urgency room for a headache on 1/11/25. Patient reported confusion and felt \"off\". Patient had a TIA in her 20s and was concerned for another TIA. Had a neuro exam without deficits. Patient was transferred to the ED by a family member.      PAST MEDICAL HISTORY:  History reviewed. No pertinent past medical history.    PAST SURGICAL HISTORY:  Past Surgical History:   Procedure Laterality Date    MI BIOPSY CERVIX, 1 OR MORE, OR EXCISION OF LESION N/A 8/21/2018    Procedure: POLYPECTOMY, CERVICAL;  Surgeon: Kristen Galloway DO;  Location: [facility];  Service: Gynecology    MI HYSTEROSCOPY,W/ENDO BX N/A 8/21/2018    Procedure: HYSTEROSCOPY, WITH DILATION AND CURETTAGE, POLYPECTOMY;  Surgeon: Kristen Galloway DO;  Location: [facility];  Service: Gynecology    MI LAP,FULGURATE/EXCISE LESIONS Left 7/16/2019    Procedure: LAPAROSCOPY, FULGERATION OF ENDOMETRIOSIS;  Surgeon: Kristen Galloway DO;  Location: [facility];  Service: Gynecology    WISDOM TOOTH EXTRACTION Bilateral          CURRENT MEDICATIONS:    Current Facility-Administered Medications   Medication Dose Route Frequency Provider Last Rate Last Admin    sodium chloride 0.9 % bag for CT scan flush use  100 mL As instructed Once Dima Duong DO         Current Outpatient Medications   Medication Sig Dispense Refill    diphenhydrAMINE (BENADRYL) 25 mg capsule [DIPHENHYDRAMINE (BENADRYL) 25 MG CAPSULE] Take 25 mg by mouth 2 (two) times a day.      ibuprofen (ADVIL,MOTRIN) 200 MG tablet [IBUPROFEN (ADVIL,MOTRIN) 200 MG TABLET] Take 3 tablets (600 mg total) by mouth every 6 (six) hours as needed for pain.  0    multivitamin therapeutic tablet [MULTIVITAMIN THERAPEUTIC TABLET] Take 1 tablet by mouth daily.      oxyCODONE (ROXICODONE) 5 MG immediate release tablet [OXYCODONE (ROXICODONE) 5 MG IMMEDIATE RELEASE TABLET] Take 1 tablet (5 mg total) by mouth every 6 (six) hours as needed for pain. 12 " "tablet 0    spironolactone (ALDACTONE) 50 MG tablet [SPIRONOLACTONE (ALDACTONE) 50 MG TABLET] Take 50 mg by mouth daily.           ALLERGIES:  No Known Allergies    FAMILY HISTORY:  No family history on file.    SOCIAL HISTORY:  Social History     Socioeconomic History    Marital status: Single   Tobacco Use    Smoking status: Never    Smokeless tobacco: Never   Substance and Sexual Activity    Alcohol use: Yes     Comment: Alcoholic Drinks/day:  2X month    Drug use: No    Sexual activity: Never       VITALS:  Patient Vitals for the past 24 hrs:   BP Temp Temp src Pulse Resp SpO2 Height Weight   01/11/25 1815 109/67 -- -- 80 18 100 % -- --   01/11/25 1757 121/70 -- -- 80 18 100 % -- --   01/11/25 1742 128/63 -- -- 85 21 100 % -- --   01/11/25 1713 137/84 98.1  F (36.7  C) Oral 98 16 98 % 1.727 m (5' 8\") 70.8 kg (156 lb)       PHYSICAL EXAM    VITAL SIGNS: /67   Pulse 80   Temp 98.1  F (36.7  C) (Oral)   Resp 18   Ht 1.727 m (5' 8\")   Wt 70.8 kg (156 lb)   SpO2 100%   BMI 23.72 kg/m      General Appearance: Well-appearing, well-nourished, no acute distress   Head:  Normocephalic, without obvious abnormality, atraumatic  Eyes:  PERRL, conjunctiva/corneas clear, EOM's intact,  ENT:  Lips, mucosa, and tongue normal, membranes are moist without pallor  Neck:  Normal ROM, symmetrical, trachea midline    Cardio:  Regular rate and rhythm, no murmur, rub or gallop, 2+ pulses symmetric in all extremities  Pulm:  Clear to auscultation bilaterally, respirations unlabored,  Musculoskeletal: Full ROM, no edema, no cyanosis, good ROM of major joints  Integument:  Warm, Dry, No erythema, No rash.    Neurologic:  Patient is alert and oriented ×3.  Face is symmetric.  Speech is normal.  Visual fields are full.  Cranial nerves II through XII are grossly intact. Motor tone is 5/5 and equal in both upper and lower extremities.  Bilateral symmetric sensation grossly intact upper and lower.  Neg finger-nose. Neg heel-shin.  " Neg Radha.       [employer] Stroke Scale  Exam Interval: Baseline   Score    Level of consciousness: (0)   Alert, keenly responsive    LOC questions: (0)   Answers both questions correctly    LOC commands: (0)   Performs both tasks correctly    Best gaze: (0)   Normal    Visual: (0)   No visual loss    Facial palsy: (0)   Normal symmetrical movements    Motor arm (left): (0)   No drift    Motor arm (right): (0)   No drift    Motor leg (left): (0)   No drift    Motor leg (right): (0)   No drift    Limb ataxia: (0)   Absent    Sensory: (0)   Normal- no sensory loss    Best language: (0)   Normal- no aphasia    Dysarthria: (0)   Normal    Extinction and inattention: (0)   No abnormality        Total Score:  0             LABS  Results for orders placed or performed during the hospital encounter of 01/11/25 (from the past 24 hours)   CBC with Platelets & Differential    Narrative    The following orders were created for panel order CBC with Platelets & Differential.  Procedure                               Abnormality         Status                     ---------                               -----------         ------                     CBC with platelets and d...[[id number]]                      Final result                 Please view results for these tests on the individual orders.   Basic metabolic panel   Result Value Ref Range    Sodium 138 135 - 145 mmol/L    Potassium 3.6 3.4 - 5.3 mmol/L    Chloride 105 98 - 107 mmol/L    Carbon Dioxide (CO2) 20 (L) 22 - 29 mmol/L    Anion Gap 13 7 - 15 mmol/L    Urea Nitrogen 12.0 6.0 - 20.0 mg/dL    Creatinine 0.84 0.51 - 0.95 mg/dL    GFR Estimate 88 >60 mL/min/1.73m2    Calcium 9.3 8.8 - 10.4 mg/dL    Glucose 99 70 - 99 mg/dL   INR   Result Value Ref Range    INR 1.01 0.85 - 1.15   Partial thromboplastin time   Result Value Ref Range    aPTT 27 22 - 38 Seconds   Troponin T, High Sensitivity   Result Value Ref Range    Troponin T, High Sensitivity <6  <=14 ng/L   hCG Qualitative Pregnancy   Result Value Ref Range    hCG Serum Qualitative Negative Negative   CBC with platelets and differential   Result Value Ref Range    WBC Count 7.3 4.0 - 11.0 10e3/uL    RBC Count 4.67 3.80 - 5.20 10e6/uL    Hemoglobin 14.2 11.7 - 15.7 g/dL    Hematocrit 42.3 35.0 - 47.0 %    MCV 91 78 - 100 fL    MCH 30.4 26.5 - 33.0 pg    MCHC 33.6 31.5 - 36.5 g/dL    RDW 13.5 10.0 - 15.0 %    Platelet Count 249 150 - 450 10e3/uL    % Neutrophils 75 %    % Lymphocytes 16 %    % Monocytes 7 %    % Eosinophils 2 %    % Basophils 0 %    % Immature Granulocytes 0 %    NRBCs per 100 WBC 0 <1 /100    Absolute Neutrophils 5.5 1.6 - 8.3 10e3/uL    Absolute Lymphocytes 1.2 0.8 - 5.3 10e3/uL    Absolute Monocytes 0.5 0.0 - 1.3 10e3/uL    Absolute Eosinophils 0.1 0.0 - 0.7 10e3/uL    Absolute Basophils 0.0 0.0 - 0.2 10e3/uL    Absolute Immature Granulocytes 0.0 <=0.4 10e3/uL    Absolute NRBCs 0.0 10e3/uL   CTA Head Neck with Contrast    Narrative    EXAM: CTA HEAD NECK W CONTRAST  LOCATION: [facility]  DATE: 1/11/2025    INDICATION: Code Stroke to evaluate for potential thrombolysis and thrombectomy. PLEASE READ IMMEDIATELY. Acute onset confusion.  COMPARISON: None.  CONTRAST: 67 ml iso 370  TECHNIQUE: Head and neck CT angiogram with IV contrast. Noncontrast head CT followed by axial helical CT images of the head and neck vessels obtained during the arterial phase of intravenous contrast administration. Axial 2D reconstructed images and   multiplanar 3D MIP reconstructed images of the head and neck vessels were performed by the technologist. Dose reduction techniques were used. All stenosis measurements made according to NASCET criteria unless otherwise specified.    FINDINGS:   NONCONTRAST HEAD CT:   INTRACRANIAL CONTENTS: No intracranial hemorrhage, extraaxial collection, or mass effect.  No CT evidence of acute infarct. Normal parenchymal attenuation. Normal ventricles and  sulci.     VISUALIZED ORBITS/SINUSES/MASTOIDS: No intraorbital abnormality. No paranasal sinus mucosal disease. No middle ear or mastoid effusion.    BONES/SOFT TISSUES: No acute abnormality.    HEAD CTA:  ANTERIOR CIRCULATION: No stenosis/occlusion, aneurysm, or high flow vascular malformation. Standard [place] anatomy.    POSTERIOR CIRCULATION: No stenosis/occlusion, aneurysm, or high flow vascular malformation. Balanced vertebral arteries supply a normal basilar artery.     DURAL VENOUS SINUSES: Expected enhancement of the major dural venous sinuses.    NECK CTA:  RIGHT CAROTID: No measurable stenosis or dissection.    LEFT CAROTID: No measurable stenosis or dissection.    VERTEBRAL ARTERIES: No focal stenosis or dissection. Balanced vertebral arteries.    AORTIC ARCH: Classic aortic arch anatomy with no significant stenosis at the origin of the great vessels.    NONVASCULAR STRUCTURES: Unremarkable.      Impression    IMPRESSION:   HEAD CT:  1.  Normal head CT.    HEAD CTA:   1.  Normal CTA [place].    NECK CTA:  1.  Normal neck CTA.    Findings were discussed over the phone with Dr. Duong on 1/11/2025 5:35 PM CST.       MR Brain w/o Contrast    Narrative    EXAM: MR BRAIN W/O CONTRAST  LOCATION: [facility]  DATE: 1/11/2025    INDICATION: Amnesia.  COMPARISON: CTA 1/11/2025.  TECHNIQUE: Routine multiplanar multisequence head MRI without intravenous contrast.    FINDINGS:  INTRACRANIAL CONTENTS: No acute or subacute infarct. No mass, acute hemorrhage, or extra-axial fluid collections. Normal brain parenchymal signal. Normal ventricles and sulci. Normal position of the cerebellar tonsils.     SELLA: No abnormality accounting for technique.    OSSEOUS STRUCTURES/SOFT TISSUES: Normal marrow signal. The major intracranial vascular flow voids are maintained.     ORBITS: No abnormality accounting for technique.     SINUSES/MASTOIDS: Small mucous retention cyst right maxillary  sinus. No middle ear or mastoid effusion.       Impression    IMPRESSION:  1.  Normal head MRI.         RADIOLOGY  MR Brain w/o Contrast   Final Result   IMPRESSION:   1.  Normal head MRI.      CTA Head Neck with Contrast   Final Result   IMPRESSION:    HEAD CT:   1.  Normal head CT.      HEAD CTA:    1.  Normal CTA [place].      NECK CTA:   1.  Normal neck CTA.      Findings were discussed over the phone with Dr. Duong on 1/11/2025 5:35 PM CST.                 EKG:    Rate: 74 bpm  Rhythm: Normal Sinus Rhythm  Axis: Normal  Interval: Normal  Conduction: Normal  QRS: Normal  ST: Normal  T-wave: Normal  QT: Not prolonged  Comparison EKG: No significant change when compared to 18 July 2019  Impression:  No acute ischemic change   I have independently reviewed and interpreted today's EKG, pending Cardiologist read          MEDICATIONS GIVEN IN THE EMERGENCY:  Medications   iopamidol (ISOVUE-370) solution 67 mL (67 mLs Intravenous $Given 1/11/25 1725)     And   sodium chloride 0.9 % bag for CT scan flush use (has no administration in time range)   LORazepam (ATIVAN) injection 2 mg ( Intravenous See Alternative 1/11/25 1817)     Or   LORazepam (ATIVAN) tablet 2 mg (2 mg Oral $Given 1/11/25 1817)       NEW PRESCRIPTIONS STARTED AT TODAY'S ER VISIT  New Prescriptions    No medications on file        I, Gris Ron, am serving as a scribe to document services personally performed by Dima Duong D.O., based on my observations and the provider's statements to me.  I, Dima Duong D.O., attest that Gris Ron is acting in a scribe capacity, has observed my performance of the services and has documented them in accordance with my direction.     Dima Duong D.O.  Emergency Medicine  [facility] EMERGENCY DEPARTMENT  [facility] [facility] [id number] [phone]  Dept: [phone]       Dima Duong DO  01/11/25 2010

## 2025-01-12 VITALS
DIASTOLIC BLOOD PRESSURE: 65 MMHG | TEMPERATURE: 97.8 F | HEART RATE: 75 BPM | BODY MASS INDEX: 23.57 KG/M2 | OXYGEN SATURATION: 96 % | WEIGHT: 155.5 LBS | SYSTOLIC BLOOD PRESSURE: 94 MMHG | RESPIRATION RATE: 16 BRPM | HEIGHT: 68 IN

## 2025-01-12 LAB
ANION GAP SERPL CALCULATED.3IONS-SCNC: 10 MMOL/L (ref 7–15)
BUN SERPL-MCNC: 9.1 MG/DL (ref 6–20)
CALCIUM SERPL-MCNC: 9 MG/DL (ref 8.8–10.4)
CHLORIDE SERPL-SCNC: 107 MMOL/L (ref 98–107)
CREAT SERPL-MCNC: 0.77 MG/DL (ref 0.51–0.95)
EGFRCR SERPLBLD CKD-EPI 2021: >90 ML/MIN/1.73M2
ERYTHROCYTE [DISTWIDTH] IN BLOOD BY AUTOMATED COUNT: 13.6 % (ref 10–15)
GLUCOSE SERPL-MCNC: 81 MG/DL (ref 70–99)
HCO3 SERPL-SCNC: 22 MMOL/L (ref 22–29)
HCT VFR BLD AUTO: 38 % (ref 35–47)
HGB BLD-MCNC: 12.9 G/DL (ref 11.7–15.7)
MCH RBC QN AUTO: 30.6 PG (ref 26.5–33)
MCHC RBC AUTO-ENTMCNC: 33.9 G/DL (ref 31.5–36.5)
MCV RBC AUTO: 90 FL (ref 78–100)
PHOSPHATE SERPL-MCNC: 2.5 MG/DL (ref 2.5–4.5)
PLATELET # BLD AUTO: 221 10E3/UL (ref 150–450)
POTASSIUM SERPL-SCNC: 3.9 MMOL/L (ref 3.4–5.3)
RBC # BLD AUTO: 4.22 10E6/UL (ref 3.8–5.2)
SODIUM SERPL-SCNC: 139 MMOL/L (ref 135–145)
WBC # BLD AUTO: 6.6 10E3/UL (ref 4–11)

## 2025-01-12 PROCEDURE — 85027 COMPLETE CBC AUTOMATED: CPT

## 2025-01-12 PROCEDURE — G0378 HOSPITAL OBSERVATION PER HR: HCPCS

## 2025-01-12 PROCEDURE — 80048 BASIC METABOLIC PNL TOTAL CA: CPT

## 2025-01-12 PROCEDURE — 99204 OFFICE O/P NEW MOD 45 MIN: CPT | Performed by: PSYCHIATRY & NEUROLOGY

## 2025-01-12 PROCEDURE — 99239 HOSP IP/OBS DSCHRG MGMT >30: CPT | Performed by: STUDENT IN AN ORGANIZED HEALTH CARE EDUCATION/TRAINING PROGRAM

## 2025-01-12 PROCEDURE — 36415 COLL VENOUS BLD VENIPUNCTURE: CPT

## 2025-01-12 ASSESSMENT — ACTIVITIES OF DAILY LIVING (ADL)
ADLS_ACUITY_SCORE: 44
ADLS_ACUITY_SCORE: 38
ADLS_ACUITY_SCORE: 44
ADLS_ACUITY_SCORE: 44
ADLS_ACUITY_SCORE: 38
ADLS_ACUITY_SCORE: 44
ADLS_ACUITY_SCORE: 44
ADLS_ACUITY_SCORE: 38
ADLS_ACUITY_SCORE: 38

## 2025-01-12 NOTE — H&P
"[facility]    History and Physical - Hospitalist Service       Date of Admission:  1/11/2025    Assessment & Plan    Lida Leon is a 44 year old female with Pmhx of TIA (2009), anxiety, migraines who was admitted on 1/11/2025. She presented to the ED for confusion, feeling altered. Code stroke called in the ED and brain imaging unremarkable. Admitted for observation and general neurology consult     Confusion   H/o TIA (2009)  Around 2:30 PM today didn't recognize her own apartment, difficulty reading Google maps, had a sense of \"candelaria vu.\" Went to  initially and subsequently presented to the ER   -- MRI brain normal   -- CT Head, CTA head/neck normal   -- CBC, BMP unremarkable   -- Phos minimally low, Mag/TSH within normal limits   -- Checking UDS  -- EEG ordered   -- Gen neuro consult placed   -- Telemonitoring     Migraine   Has not seen a neurologist for this in the past   -- PRN Tylenol/ibuprofen, antiemetics     Anxiety   -- Given Ativan in the ED   -- PRN hydroxyzine        Observation Goals: -diagnostic tests and consults completed and resulted, -vital signs normal or at patient baseline, -returns to baseline functional status, -safe disposition plan has been identified, Nurse to notify provider when observation goals have been met and patient is ready for discharge.  Diet: Regular Diet Adult    DVT Prophylaxis: Pneumatic Compression Devices  Del Cid Catheter: Not present  Lines: None     Cardiac Monitoring: ACTIVE order. Indication: TIA v TGA  Code Status: Full Code    Clinically Significant Risk Factors Present on Admission                                        Disposition Plan     Medically Ready for Discharge: Anticipated Tomorrow       The patient's care was discussed with the Attending Physician, Dr. Sage Dobson who independently met with and assessed the patient and is in agreement with the assessment and plan     Sulema Cotton PA-C  Hospitalist Service  [facility]" "[facility]  Securely message with [employer] (more info)  Text page via [employer] Paging/Directory     ______________________________________________________________________    Chief Complaint   Confusion   Altered Mental State     History is obtained from the patient    History of Present Illness   Lida Leon is a 44 year old female with Pmhx of TIA (2009), anxiety, migraines who presented to the ED on 1/11/2025 for evaluation of confusion.  Patient states she was in her apartment doing work, and around 2:30 PM today she suddenly felt like she was not able to recognize the things in her apartment.  States she felt like something was \"off\" and was experiencing a sense of \"déjà vu.\"  Decided to go to  for evaluation and on the way there was looking at [employer] to track family member locations when she realized that she was unable to read the map.  In the , she was told to go to ER and patient called friend to transport who is at bedside with patient.  Friend denies seeing any acute changes, recognizes that patient seems anxious but does not appear confused.  Patient does report history of migraines that she states are typically triggered by spending long periods of time looking at her phone or computer and states this was what she was doing prior to onset of symptoms today.  Currently does feel like she is developing an onset of a migraine with some head pressure and nausea. Reports history of TIA in 2009, describes symptoms at that time with vision changes (diplopia) and unilateral sensory changes.  Patient notes high levels of stress as at her current job and does report history of anxiety.  Here in the ER does not feel like she is totally back to her baseline. States that she feels like she is \"in a dream,\" also describes a \"out of body experience.\"     Past Medical History    History reviewed. No pertinent past medical history.    Past Surgical History   Past Surgical History:   Procedure " Laterality Date    ND BIOPSY CERVIX, 1 OR MORE, OR EXCISION OF LESION N/A 8/21/2018    Procedure: POLYPECTOMY, CERVICAL;  Surgeon: Kristen Galloway DO;  Location: [facility] OR;  Service: Gynecology    ND HYSTEROSCOPY,W/ENDO BX N/A 8/21/2018    Procedure: HYSTEROSCOPY, WITH DILATION AND CURETTAGE, POLYPECTOMY;  Surgeon: Kristen Galloway DO;  Location: [facility] OR;  Service: Gynecology    ND LAP,FULGURATE/EXCISE LESIONS Left 7/16/2019    Procedure: LAPAROSCOPY, FULGERATION OF ENDOMETRIOSIS;  Surgeon: Kristen Galloway DO;  Location: [facility] OR;  Service: Gynecology    WISDOM TOOTH EXTRACTION Bilateral        Prior to Admission Medications   Prior to Admission Medications   Prescriptions Last Dose Informant Patient Reported? Taking?   diphenhydrAMINE (BENADRYL) 25 mg capsule   Yes No   Sig: [DIPHENHYDRAMINE (BENADRYL) 25 MG CAPSULE] Take 25 mg by mouth 2 (two) times a day.   ibuprofen (ADVIL,MOTRIN) 200 MG tablet   No No   Sig: [IBUPROFEN (ADVIL,MOTRIN) 200 MG TABLET] Take 3 tablets (600 mg total) by mouth every 6 (six) hours as needed for pain.   multivitamin therapeutic tablet   Yes No   Sig: [MULTIVITAMIN THERAPEUTIC TABLET] Take 1 tablet by mouth daily.   oxyCODONE (ROXICODONE) 5 MG immediate release tablet   No No   Sig: [OXYCODONE (ROXICODONE) 5 MG IMMEDIATE RELEASE TABLET] Take 1 tablet (5 mg total) by mouth every 6 (six) hours as needed for pain.   spironolactone (ALDACTONE) 50 MG tablet   Yes No   Sig: [SPIRONOLACTONE (ALDACTONE) 50 MG TABLET] Take 50 mg by mouth daily.      Facility-Administered Medications: None           Physical Exam   Vital Signs: Temp: 98.1  F (36.7  C) Temp src: Oral BP: 114/68 Pulse: 88   Resp: 26 SpO2: 98 % O2 Device: None (Room air)    Weight: 156 lbs 0 oz    Constitutional: Awake, alert, cooperative, no apparent distress, and appears stated age  Eyes: Lids and lashes normal, pupils equal, round and reactive to light, extra ocular muscles intact, sclera clear,  conjunctiva normal  Respiratory: No increased work of breathing, good air exchange, clear to auscultation bilaterally, no crackles or wheezing  Cardiovascular: Regular rate and rhythm, normal S1 and S2, no S3 or S4, and no murmur noted  Skin: Normal skin color, texture, turgor, no rashes and no jaundice  Musculoskeletal: Normal tone. No visible tremor. No lower extremity pitting edema present. 2+ DP pulses  Neurologic: Awake, alert, oriented to name, place and time. Cranial nerves II-XII are grossly intact.  Motor is 5 out of 5 bilaterally.  Sensory is intact.    Neuropsychiatric: Appropriate mood and affect    Medical Decision Making             Data     I have personally reviewed the following data over the past 24 hrs:    7.3  \   14.2   / 249     138 105 12.0 /  99   3.6 20 (L) 0.84 \     Trop: <6 BNP: N/A     TSH: 2.48 T4: N/A A1C: N/A     INR:  1.01 PTT:  27   D-dimer:  N/A Fibrinogen:  N/A       Imaging results reviewed over the past 24 hrs:   Recent Results (from the past 24 hours)   CTA Head Neck with Contrast    Narrative    EXAM: CTA HEAD NECK W CONTRAST  LOCATION: [facility]  DATE: 1/11/2025    INDICATION: Code Stroke to evaluate for potential thrombolysis and thrombectomy. PLEASE READ IMMEDIATELY. Acute onset confusion.  COMPARISON: None.  CONTRAST: 67 ml iso 370  TECHNIQUE: Head and neck CT angiogram with IV contrast. Noncontrast head CT followed by axial helical CT images of the head and neck vessels obtained during the arterial phase of intravenous contrast administration. Axial 2D reconstructed images and   multiplanar 3D MIP reconstructed images of the head and neck vessels were performed by the technologist. Dose reduction techniques were used. All stenosis measurements made according to NASCET criteria unless otherwise specified.    FINDINGS:   NONCONTRAST HEAD CT:   INTRACRANIAL CONTENTS: No intracranial hemorrhage, extraaxial collection, or mass effect.  No CT evidence  of acute infarct. Normal parenchymal attenuation. Normal ventricles and sulci.     VISUALIZED ORBITS/SINUSES/MASTOIDS: No intraorbital abnormality. No paranasal sinus mucosal disease. No middle ear or mastoid effusion.    BONES/SOFT TISSUES: No acute abnormality.    HEAD CTA:  ANTERIOR CIRCULATION: No stenosis/occlusion, aneurysm, or high flow vascular malformation. Standard [place] anatomy.    POSTERIOR CIRCULATION: No stenosis/occlusion, aneurysm, or high flow vascular malformation. Balanced vertebral arteries supply a normal basilar artery.     DURAL VENOUS SINUSES: Expected enhancement of the major dural venous sinuses.    NECK CTA:  RIGHT CAROTID: No measurable stenosis or dissection.    LEFT CAROTID: No measurable stenosis or dissection.    VERTEBRAL ARTERIES: No focal stenosis or dissection. Balanced vertebral arteries.    AORTIC ARCH: Classic aortic arch anatomy with no significant stenosis at the origin of the great vessels.    NONVASCULAR STRUCTURES: Unremarkable.      Impression    IMPRESSION:   HEAD CT:  1.  Normal head CT.    HEAD CTA:   1.  Normal CTA [place].    NECK CTA:  1.  Normal neck CTA.    Findings were discussed over the phone with Dr. Duong on 1/11/2025 5:35 PM CST.       MR Brain w/o Contrast    Narrative    EXAM: MR BRAIN W/O CONTRAST  LOCATION: [facility]  DATE: 1/11/2025    INDICATION: Amnesia.  COMPARISON: CTA 1/11/2025.  TECHNIQUE: Routine multiplanar multisequence head MRI without intravenous contrast.    FINDINGS:  INTRACRANIAL CONTENTS: No acute or subacute infarct. No mass, acute hemorrhage, or extra-axial fluid collections. Normal brain parenchymal signal. Normal ventricles and sulci. Normal position of the cerebellar tonsils.     SELLA: No abnormality accounting for technique.    OSSEOUS STRUCTURES/SOFT TISSUES: Normal marrow signal. The major intracranial vascular flow voids are maintained.     ORBITS: No abnormality accounting for  technique.     SINUSES/MASTOIDS: Small mucous retention cyst right maxillary sinus. No middle ear or mastoid effusion.       Impression    IMPRESSION:  1.  Normal head MRI.

## 2025-01-12 NOTE — PLAN OF CARE
"PRIMARY DIAGNOSIS: tansient global amnesia    OUTPATIENT/OBSERVATION GOALS TO BE MET BEFORE DISCHARGE  1. Orthostatic performed: No    2. Completion of appropriate imaging: Yes    3. Tolerating PO medications: Yes    4. Return to near baseline physical activity:  no, up w/ staff assist only, pt is slightly unsteady when up    5. Cleared for discharge by consultants (if involved): No    Discharge Planner Nurse   Safe discharge environment identified: Yes  Barriers to discharge: Yes       Entered by: Loida Hamilton RN 01/12/2025 3:27 AM   Pt is A&O x3-4, forgetful at times, states some things don't seem familiar to her or she forgets how to do things. Unaware of time, states \"wow, I cannot believe it's this late already\". VSS, softer bp, pt states that's normal for her, asymptomatic. Pt has been up to BR, no c/o dizziness. Rates achiness to R) head 2/10, declines medications. Consult gen neuro & CM today. Will cont w/ current POC.     Please review provider order for any additional goals.   Nurse to notify provider when observation goals have been met and patient is ready for discharge.  "

## 2025-01-12 NOTE — PLAN OF CARE
Goal Outcome Evaluation:      Plan of Care Reviewed With: patient    Overall Patient Progress: no changeOverall Patient Progress: no change    Assumed cares: [id number]  Vitals: VSS on RA  Pain: Pt denies any pain  Neuro: A&Ox4  Cardiac: NSR  Respiratory: WDL  GI/: WDL  Skin: No new skin changes  IV/Drains: R PIV-SL  Activity: SBA  Behavior: Pt is calm and cooperative    Plan of Care: Follow patient plan of care. Pt to discharge today.

## 2025-01-12 NOTE — PLAN OF CARE
"PRIMARY DIAGNOSIS: tansient global amnesia    OUTPATIENT/OBSERVATION GOALS TO BE MET BEFORE DISCHARGE  1. Orthostatic performed: No    2. Completion of appropriate imaging: Yes    3. Tolerating PO medications: Yes    4. Return to near baseline physical activity:  no, up w/ staff assist only, pt is slightly unsteady when up    5. Cleared for discharge by consultants (if involved): No    Discharge Planner Nurse   Safe discharge environment identified: Yes  Barriers to discharge: Yes       Entered by: Loida Hamilton RN 01/12/2025 4:43 AM   Pt is A&O x3-4, forgetful at times, states some things don't seem familiar to her or she forgets how to do things. Unaware of time, states \"wow, I cannot believe it's this late already\". VSS, softer bp, pt states that's normal for her, asymptomatic. Pt has been up to BR, no c/o dizziness. Rates achiness to R) head 2/10, declines medications. No changes in neuro assessment. Consult gen neuro & CM today. Will cont w/ current POC.     Please review provider order for any additional goals.   Nurse to notify provider when observation goals have been met and patient is ready for discharge.  "

## 2025-01-12 NOTE — PLAN OF CARE
PRIMARY DIAGNOSIS: TGA  OUTPATIENT/OBSERVATION GOALS TO BE MET BEFORE DISCHARGE:  1. Diagnostic testing complete & at baseline neurologic testing: No- neurology consult and EEG    3. Cleared by consultants (if involved): No    4. Interpretation of cardiac rhythm per telemetry tech: NSR    5. Tolerating adequate PO diet and medications: Yes    6. Return to near baseline physical activity or neurologic status: No    Discharge Planner Nurse   Safe discharge environment identified: Yes  Barriers to discharge: Yes       Entered by: Daniel Cortez RN 01/12/2025 8:53 AM     Please review provider order for any additional goals.   Nurse to notify provider when observation goals have been met and patient is ready for discharge.

## 2025-01-12 NOTE — DISCHARGE SUMMARY
[facility]  Hospitalist Discharge Summary      Date of Admission:  1/11/2025  Date of Discharge:  1/12/2025  Discharging Provider: Javier Okeefe DO  Discharge Service: Hospitalist Service    Discharge Diagnoses   Active Problems:    TGA (transient global amnesia)        Clinically Significant Risk Factors          Follow-ups Needed After Discharge   Follow-up Appointments       Follow Up      Follow up with neurology in 1-2 weeks for migraine management.        Hospital Follow-up with Existing Primary Care Provider (PCP)      Please see details below         Schedule Primary Care visit within: 14 Days               Discharge Disposition   Discharged to home  Condition at discharge: Stable    Hospital Course   Lida Leon is a 44 year old female with Pmhx of TIA (2009), anxiety, migraines who was admitted on 1/11/2025. She presented to the ED for confusion, feeling altered. Code stroke called in the ED and brain imaging unremarkable. Admitted for observation and general neurology consult.     Confusion- resolved   H/o TIA (2009)  -- possible migraine with aura as head CT and MRI unremarkable. No infections of inflammatory signs or symptoms. Issue resolved overnight on admission day, back to baseline on HD1  -- Neurology was consulted, agree with discharge and patient will follow-up with neuro for migraine management      Migraine   -- PRN Tylenol/ibuprofen, antiemetics      Anxiety   -- Given Ativan in the ED   -- PRN hydroxyzine     Low Blood Pressure  -- Asymptomatic  -- Likely related to ativan  -- PCP follow-up        Consultations This Hospital Stay   CARE MANAGEMENT / SOCIAL WORK IP CONSULT  NEUROLOGY IP CONSULT    Code Status   Full Code    Time Spent on this Encounter   IJavire DO, personally saw the patient today and spent greater than 30 minutes discharging this patient.       DO [facility] Quinonez [facility] EXTENDED RECOVERY AND SHORT  STAY  [address] [place] [id number]  Phone: [phone]  Fax: [phone]  ______________________________________________________________________    Physical Exam   Vital Signs: Temp: 97.8  F (36.6  C) Temp src: Oral BP: 94/65 Pulse: 75   Resp: 16 SpO2: 96 % O2 Device: None (Room air)    Weight: 155 lbs 8 oz  General Appearance: No acute distress, cooperative and alert  Respiratory: CTAB  Cardiovascular: RRR  GI: no pain  Skin: no rash or swollen joints  Other: No focal deficits, pupils equal and reactive to light, no nystagmus, no clonus, strength and sensation grossly regular throughout         Primary Care Physician   Lex Mcgovern    Discharge Orders      Reason for your hospital stay    Active Problems:    TGA (transient global amnesia)     Activity    Your activity upon discharge: activity as tolerated     Follow Up    Follow up with neurology in 1-2 weeks for migraine management.     Diet    Follow this diet upon discharge: Current Diet:Orders Placed This Encounter      Regular Diet Adult     Hospital Follow-up with Existing Primary Care Provider (PCP)    Please see details below            Significant Results and Procedures   Results for orders placed or performed during the hospital encounter of 01/11/25   CTA Head Neck with Contrast    Narrative    EXAM: CTA HEAD NECK W CONTRAST  LOCATION: [facility]  DATE: 1/11/2025    INDICATION: Code Stroke to evaluate for potential thrombolysis and thrombectomy. PLEASE READ IMMEDIATELY. Acute onset confusion.  COMPARISON: None.  CONTRAST: 67 ml iso 370  TECHNIQUE: Head and neck CT angiogram with IV contrast. Noncontrast head CT followed by axial helical CT images of the head and neck vessels obtained during the arterial phase of intravenous contrast administration. Axial 2D reconstructed images and   multiplanar 3D MIP reconstructed images of the head and neck vessels were performed by the technologist. Dose reduction  techniques were used. All stenosis measurements made according to NASCET criteria unless otherwise specified.    FINDINGS:   NONCONTRAST HEAD CT:   INTRACRANIAL CONTENTS: No intracranial hemorrhage, extraaxial collection, or mass effect.  No CT evidence of acute infarct. Normal parenchymal attenuation. Normal ventricles and sulci.     VISUALIZED ORBITS/SINUSES/MASTOIDS: No intraorbital abnormality. No paranasal sinus mucosal disease. No middle ear or mastoid effusion.    BONES/SOFT TISSUES: No acute abnormality.    HEAD CTA:  ANTERIOR CIRCULATION: No stenosis/occlusion, aneurysm, or high flow vascular malformation. Standard [place] anatomy.    POSTERIOR CIRCULATION: No stenosis/occlusion, aneurysm, or high flow vascular malformation. Balanced vertebral arteries supply a normal basilar artery.     DURAL VENOUS SINUSES: Expected enhancement of the major dural venous sinuses.    NECK CTA:  RIGHT CAROTID: No measurable stenosis or dissection.    LEFT CAROTID: No measurable stenosis or dissection.    VERTEBRAL ARTERIES: No focal stenosis or dissection. Balanced vertebral arteries.    AORTIC ARCH: Classic aortic arch anatomy with no significant stenosis at the origin of the great vessels.    NONVASCULAR STRUCTURES: Unremarkable.      Impression    IMPRESSION:   HEAD CT:  1.  Normal head CT.    HEAD CTA:   1.  Normal CTA [place].    NECK CTA:  1.  Normal neck CTA.    Findings were discussed over the phone with Dr. Duong on 1/11/2025 5:35 PM CST.       MR Brain w/o Contrast    Narrative    EXAM: MR BRAIN W/O CONTRAST  LOCATION: [facility]  DATE: 1/11/2025    INDICATION: Amnesia.  COMPARISON: CTA 1/11/2025.  TECHNIQUE: Routine multiplanar multisequence head MRI without intravenous contrast.    FINDINGS:  INTRACRANIAL CONTENTS: No acute or subacute infarct. No mass, acute hemorrhage, or extra-axial fluid collections. Normal brain parenchymal signal. Normal ventricles and sulci. Normal  position of the cerebellar tonsils.     SELLA: No abnormality accounting for technique.    OSSEOUS STRUCTURES/SOFT TISSUES: Normal marrow signal. The major intracranial vascular flow voids are maintained.     ORBITS: No abnormality accounting for technique.     SINUSES/MASTOIDS: Small mucous retention cyst right maxillary sinus. No middle ear or mastoid effusion.       Impression    IMPRESSION:  1.  Normal head MRI.       Discharge Medications   Current Discharge Medication List        CONTINUE these medications which have NOT CHANGED    Details   ibuprofen (ADVIL/MOTRIN) 200 MG tablet Take 200-400 mg by mouth every 6 hours as needed for pain, fever or inflammatory pain.           Allergies   No Known Allergies

## 2025-01-12 NOTE — SUMMARY OF CARE
Patient admitted to room 8 at approximately 2030 via wheel chair from emergency room.    Patient ambulated/transferred:  independently. self.    Detailed List of Belongings (be very specific listing out each item):  Jacket  Change of clothes  Cell phone  Purse

## 2025-01-12 NOTE — CONSULTS
[facility] Neurology  [place]    Lida Leon MRN# [id number]   Age: 44 year old YOB: 1980               Assessment and Plan:      Transient alteration of awareness  TGA vs migraine aura vs anxiety state     MRI of the brain is normal which rules out worrisome condition such as stroke, tumor or hemorrhage, and I was happy to reassure the patient about that.  She feels back to normal now and can be discharged home from my perspective.    She should follow-up with the neurology clinic in regards to treatment of her migraine headaches which have become more frequent recently.    Presentation is not suggestive of seizure, we can skip the EEG for now and the patient is in agreement with that.          Chief Complaint/HPI:     This patient is a 44-year-old woman seen for neurologic evaluation today.  She came to the hospital yesterday afternoon.  She was having trouble remembering things.  As an example, she felt like she was seeing her apartment for the first time.  This was very distressing to her.  Her friend who is with us today was also with her yesterday and does not describe repeating questions such as what happened.  Concern was raised for stroke and she was evaluated by the telestroke team.   MRI of the brain was ordered and came back normal.  The patient feels back to her normal self now and her friend here at the bedside concurs.    The patient has also been having more frequent migraine headaches recently.  She does admit to being in a stressful job.  She wonders if fluctuations due to endometriosis may be contributing as well.  She does have an aura, she describes seeing faces transposed such as on TV followed by a more scintillating or kaleidoscope type visual symptom.              Past Medical History:    has no past medical history on file.          Past Surgical History:    has a past surgical history that includes [place] Tooth Extraction (Bilateral); Pr Hysteroscopy,W/Endo Bx  (N/A, 8/21/2018); Pr Biopsy Cervix, 1 Or More, Or Excision Of Lesion (N/A, 8/21/2018); and Pr Lap,Fulgurate/Excise Lesions (Left, 7/16/2019).          Social History:     Social History     Tobacco Use    Smoking status: Never    Smokeless tobacco: Never   Substance Use Topics    Alcohol use: Yes     Comment: Alcoholic Drinks/day:  2X month             Family History:   No family history on file.  Strong family history of migraines          Allergies:   No Known Allergies          Medications:     Current Facility-Administered Medications:     acetaminophen (TYLENOL) tablet 650 mg, 650 mg, Oral, Q4H PRN **OR** acetaminophen (TYLENOL) Suppository 650 mg, 650 mg, Rectal, Q4H PRN, Sulema Cotton PA-C    hydrOXYzine HCl (ATARAX) tablet 25 mg, 25 mg, Oral, Q6H PRN, Sulema Cotton PA-C    ibuprofen (ADVIL/MOTRIN) tablet 200 mg, 200 mg, Oral, Q6H PRN, Sulema Cotton PA-C    melatonin tablet 5 mg, 5 mg, Oral, At Bedtime PRN, Sulema Cotton PA-C    naloxone (NARCAN) injection 0.2 mg, 0.2 mg, Intravenous, Q2 Min PRN **OR** naloxone (NARCAN) injection 0.4 mg, 0.4 mg, Intravenous, Q2 Min PRN **OR** naloxone (NARCAN) injection 0.2 mg, 0.2 mg, Intramuscular, Q2 Min PRN **OR** naloxone (NARCAN) injection 0.4 mg, 0.4 mg, Intramuscular, Q2 Min PRN, Sage Dobson DO    ondansetron (ZOFRAN ODT) ODT tab 4 mg, 4 mg, Oral, Q6H PRN **OR** ondansetron (ZOFRAN) injection 4 mg, 4 mg, Intravenous, Q6H PRN, Sulema Cotton PA-C    polyethylene glycol (MIRALAX) Packet 17 g, 17 g, Oral, BID PRN, Sulema Cotton PA-C    prochlorperazine (COMPAZINE) injection 10 mg, 10 mg, Intravenous, Q6H PRN **OR** prochlorperazine (COMPAZINE) tablet 10 mg, 10 mg, Oral, Q6H PRN, Sulema Cotton PA-C    senna-docusate (SENOKOT-S/PERICOLACE) 8.6-50 MG per tablet 1 tablet, 1 tablet, Oral, BID PRN **OR** senna-docusate (SENOKOT-S/PERICOLACE) 8.6-50 MG per tablet 2 tablet, 2 tablet, Oral, BID PRN, Sulema Cotton PA-C    [COMPLETED] iopamidol (ISOVUE-370) solution 67 mL, 67 mL,  "Intravenous, Once, 67 mL at 01/11/25 1725 **AND** sodium chloride 0.9 % bag for CT scan flush use, 100 mL, As instructed, Once, Dima Duong, DO              Physical Exam:      Vitals: BP 94/65 (BP Location: Left arm)   Pulse 75   Temp 97.8  F (36.6  C) (Oral)   Resp 16   Ht 1.727 m (5' 8\")   Wt 70.5 kg (155 lb 8 oz)   SpO2 96%   BMI 23.64 kg/m    BMI= Body mass index is 23.64 kg/m .     Patient is alert and in no acute distress. Neck was supple, no carotid bruits, thyromegaly, lymphadenopathy or JVD noted.   Neurological Exam:   Mental status: Patient is alert and oriented x 3. Speech is clear and fluent   She does fine on naming and repeating  She can calculate nickels in $1  Recall is 3 out of 4   CN II: Visual fields are full to confrontation.  PERRLA.   CN III, IV, VI: EOMI.    CN VII: Face is symmetric with normal eye closure and smile.   CN VII: Hearing is normal to conversation   CN IX, X: Palate elevates symmetrically. Phonation is normal.    CN XI: Head turning and shoulder shrug are intact   CN XII: Tongue is midline with normal movements and no atrophy or fasciculations.   Motor: Muscle bulk and tone are normal. No pronator drift. Strength is 5/5 bilaterally. No fasciculations noted.   Coordination: No ataxia or dysmetria   Gait: gait is steady and independent         Eliseo Kaba MD       More than 45 minutes spent reviewing records and results, evaluating patient, discussing with pt and friend here at bedside and on documentation    "

## 2025-01-12 NOTE — PHARMACY-ADMISSION MEDICATION HISTORY
Pharmacist Admission Medication History    Admission medication history is complete. The information provided in this note is only as accurate as the sources available at the time of the update.    Information Source(s): Patient and CareEverywhere/SureScripts via in-person    Pertinent Information: Completed pna treatment ~2wks ago with prednisone, doxycycline, and Afrin for nasal symptoms.    Took 3 ibu today but generally takes 1-2 daily to help with migraine prevention.    Reports no current RX medications.    Changes made to PTA medication list:  Added: None  Deleted: Benadryl, mvi, oxy, spironolactone   Changed: None    Allergies reviewed with patient and updates made in EHR: yes    Medication History Completed By: Adriana Valerio RPH 1/11/2025 8:08 PM    PTA Med List   Medication Sig Last Dose/Taking    ibuprofen (ADVIL/MOTRIN) 200 MG tablet Take 200-400 mg by mouth every 6 hours as needed for pain, fever or inflammatory pain. 1/11/2025 Noon

## 2025-01-12 NOTE — PLAN OF CARE
Patient discharged to home at 1322 via Private Car.  Accompanied by friend and staff.  Discharge instructions were reviewed with patient, opportunity offered to ask questions.    Prescriptions N/A.  Access discontinued: Yes  Care plan and education discontinued: Yes  Home meds retrieved from pharmacy: N/A  Belongings were sent home with patient/family:   sweatshirt, sweatpants, jeans, jacket, toiletry bag, purse, cell phone, black duffle bag, hat .

## 2025-01-12 NOTE — CARE PLAN
"Patient admitted to room SS8 at approximately 2030 via wheel chair from emergency room.  Reason for Admission:   Report received from:   Patient was accompanied by friend.  Discharge transportation provided by:  Patient ambulated/transferred:  independently. self.  Patient is alert and orientated x 3, but forgetful w/ short term & long term memory loss. Feels \"out of body\" at times.   Outpatient Observation education provided to: (patient, family, friend)  MDRO Education done if applicable (MRSA, VRE, etc)  Safety risks were identified during admission:  fall.   Yellow risk/fall band applied:  Yes  Home meds sent home: No  Home meds sent to pharmacy:No IF YES add 1/2 sheet laminated page reminder to chart/clipboard   Detailed Belongings: black jacket, white tshirt, jeans, white socks, orange bra, white converse shoes, cell phone, purse.     "

## 2025-01-12 NOTE — ED NOTES
"[facility] ED Handoff Report    ED Chief Complaint: Altered Mental status    ED Diagnosis:  (G45.4) TGA (transient global amnesia)  Comment: Continue to monitor neuro status.  Plan: Ongoing neuro assessments.       PMH:  History reviewed. No pertinent past medical history.     Code Status:  No Order     Falls Risk: Yes Band: Applied    Current Living Situation/Residence: lives alone     Elimination Status: Continent: Yes     Activity Level: Independent    Patients Preferred Language:  English     Needed: No    Vital Signs:  /68   Pulse 88   Temp 98.1  F (36.7  C) (Oral)   Resp 26   Ht 1.727 m (5' 8\")   Wt 70.8 kg (156 lb)   SpO2 98%   BMI 23.72 kg/m       Cardiac Rhythm: Sinus Rhythm    Pain Score: 1/10    Is the Patient Confused:  No    Last Food or Drink: 01/11/25 at 1200    Focused Assessment:  Pt c/o AMS and Tier 1 Stroke Code called. All imaging was negative for stroke and code was de-escalated. Pt is alert and oriented x4. Pt appears to resting comfortably with unlabored and equal respirations. Pt c/o 1/10 pain at base of head. Neuro assessment completed at first encounter with pt. Exam negative besides pt reporting some numbness in RLE, pt is unsure if that was d/t her shoe being too tight.    Tests Performed: Done: Labs and Imaging    Treatments Provided:  Neuro assessments    Family Dynamics/Concerns: No    Family Updated On Visitor Policy: Yes    Plan of Care Communicated to Family: Yes    Who Was Updated about Plan of Care: Pt    Belongings Checklist Done and Signed by Patient: N/A    Medications sent with patient: N/A    Covid: asymptomatic , not completed    Additional Information: Pt was having some issues with short term memory. She was able to recall what she had for lunch but was unsure if she had yogurt or not.     RN: Martha Meza RN   1/11/2025 7:51 PM      "

## 2025-01-14 ENCOUNTER — PATIENT OUTREACH (OUTPATIENT)
Dept: CARE COORDINATION | Facility: CLINIC | Age: 45
End: 2025-01-14
Payer: COMMERCIAL

## 2025-01-14 NOTE — PROGRESS NOTES
[facility] Care Resource Center Contact  [facility]/Voicemail     Clinical Data: Post-Discharge Outreach     Outreach attempted x 2.  Left message on patient's voicemail, providing [facility]'s central phone number of [phone] ([phone]) for questions/concerns and/or to schedule an appt with an [facility] provider, if they do not have a PCP.      Plan:  [facility] will do no further outreaches at this time.     ROSSY Aiken  [phone]  [facility]     *Connected Care Resource Team does NOT follow patient ongoing. Referrals are identified based on internal discharge reports and the outreach is to ensure patient has an understanding of their discharge instructions.

## 2025-01-15 ENCOUNTER — LAB REQUISITION (OUTPATIENT)
Dept: LAB | Facility: CLINIC | Age: 45
End: 2025-01-15

## 2025-01-15 DIAGNOSIS — Z13.6 ENCOUNTER FOR SCREENING FOR CARDIOVASCULAR DISORDERS: ICD-10-CM

## 2025-01-15 DIAGNOSIS — Z01.419 ENCOUNTER FOR GYNECOLOGICAL EXAMINATION (GENERAL) (ROUTINE) WITHOUT ABNORMAL FINDINGS: ICD-10-CM

## 2025-01-15 PROCEDURE — 87624 HPV HI-RISK TYP POOLED RSLT: CPT | Performed by: FAMILY MEDICINE

## 2025-01-15 PROCEDURE — 80061 LIPID PANEL: CPT | Performed by: FAMILY MEDICINE

## 2025-01-16 LAB
CHOLEST SERPL-MCNC: 248 MG/DL
FASTING STATUS PATIENT QL REPORTED: ABNORMAL
HDLC SERPL-MCNC: 59 MG/DL
HPV HR 12 DNA CVX QL NAA+PROBE: NEGATIVE
HPV16 DNA CVX QL NAA+PROBE: NEGATIVE
HPV18 DNA CVX QL NAA+PROBE: NEGATIVE
HUMAN PAPILLOMA VIRUS FINAL DIAGNOSIS: NORMAL
LDLC SERPL CALC-MCNC: 174 MG/DL
NONHDLC SERPL-MCNC: 189 MG/DL
TRIGL SERPL-MCNC: 75 MG/DL

## 2025-01-20 LAB
BKR AP ASSOCIATED HPV REPORT: NORMAL
BKR LAB AP GYN ADEQUACY: NORMAL
BKR LAB AP GYN INTERPRETATION: NORMAL
BKR LAB AP LMP: NORMAL
BKR LAB AP PREVIOUS ABNL DX: NORMAL
BKR LAB AP PREVIOUS ABNORMAL: NORMAL
PATH REPORT.COMMENTS IMP SPEC: NORMAL
PATH REPORT.COMMENTS IMP SPEC: NORMAL
PATH REPORT.RELEVANT HX SPEC: NORMAL

## 2025-01-23 LAB
ATRIAL RATE - MUSE: 74 BPM
DIASTOLIC BLOOD PRESSURE - MUSE: 63 MMHG
INTERPRETATION ECG - MUSE: NORMAL
P AXIS - MUSE: 70 DEGREES
PR INTERVAL - MUSE: 120 MS
QRS DURATION - MUSE: 82 MS
QT - MUSE: 388 MS
QTC - MUSE: 430 MS
R AXIS - MUSE: 87 DEGREES
SYSTOLIC BLOOD PRESSURE - MUSE: 128 MMHG
T AXIS - MUSE: 58 DEGREES
VENTRICULAR RATE- MUSE: 74 BPM

## 2025-04-06 ENCOUNTER — HEALTH MAINTENANCE LETTER (OUTPATIENT)
Age: 45
End: 2025-04-06